# Patient Record
Sex: FEMALE | Race: WHITE | NOT HISPANIC OR LATINO | Employment: FULL TIME | URBAN - METROPOLITAN AREA
[De-identification: names, ages, dates, MRNs, and addresses within clinical notes are randomized per-mention and may not be internally consistent; named-entity substitution may affect disease eponyms.]

---

## 2023-05-10 ENCOUNTER — OFFICE VISIT (OUTPATIENT)
Dept: FAMILY MEDICINE | Facility: CLINIC | Age: 38
End: 2023-05-10
Payer: COMMERCIAL

## 2023-05-10 ENCOUNTER — LAB VISIT (OUTPATIENT)
Dept: LAB | Facility: HOSPITAL | Age: 38
End: 2023-05-10
Payer: COMMERCIAL

## 2023-05-10 VITALS
TEMPERATURE: 98 F | BODY MASS INDEX: 21.94 KG/M2 | WEIGHT: 128.5 LBS | DIASTOLIC BLOOD PRESSURE: 68 MMHG | OXYGEN SATURATION: 98 % | HEIGHT: 64 IN | HEART RATE: 110 BPM | SYSTOLIC BLOOD PRESSURE: 110 MMHG

## 2023-05-10 DIAGNOSIS — Z83.49 FAMILY HISTORY OF ALPHA 1 ANTITRYPSIN DEFICIENCY: ICD-10-CM

## 2023-05-10 DIAGNOSIS — R23.3 EASY BRUISING: ICD-10-CM

## 2023-05-10 DIAGNOSIS — E78.00 ELEVATED LDL CHOLESTEROL LEVEL: ICD-10-CM

## 2023-05-10 DIAGNOSIS — J32.9 SINUSITIS, UNSPECIFIED CHRONICITY, UNSPECIFIED LOCATION: Primary | ICD-10-CM

## 2023-05-10 DIAGNOSIS — Z76.89 ESTABLISHING CARE WITH NEW DOCTOR, ENCOUNTER FOR: ICD-10-CM

## 2023-05-10 DIAGNOSIS — R68.83 CHILLS: ICD-10-CM

## 2023-05-10 DIAGNOSIS — Z86.718 HISTORY OF DEEP VENOUS THROMBOSIS (DVT) OF DISTAL VEIN OF RIGHT LOWER EXTREMITY: ICD-10-CM

## 2023-05-10 DIAGNOSIS — R05.1 ACUTE COUGH: ICD-10-CM

## 2023-05-10 LAB
IRON SERPL-MCNC: 81 UG/DL (ref 30–160)
SATURATED IRON: 32 % (ref 20–50)
TOTAL IRON BINDING CAPACITY: 256 UG/DL (ref 250–450)
TRANSFERRIN SERPL-MCNC: 173 MG/DL (ref 200–375)

## 2023-05-10 PROCEDURE — 36415 COLL VENOUS BLD VENIPUNCTURE: CPT | Performed by: FAMILY MEDICINE

## 2023-05-10 PROCEDURE — 3078F PR MOST RECENT DIASTOLIC BLOOD PRESSURE < 80 MM HG: ICD-10-PCS | Mod: S$GLB,,, | Performed by: FAMILY MEDICINE

## 2023-05-10 PROCEDURE — 82542 COL CHROMOTOGRAPHY QUAL/QUAN: CPT | Performed by: FAMILY MEDICINE

## 2023-05-10 PROCEDURE — 99999 PR PBB SHADOW E&M-EST. PATIENT-LVL IV: ICD-10-PCS | Mod: PBBFAC,,, | Performed by: FAMILY MEDICINE

## 2023-05-10 PROCEDURE — 1159F PR MEDICATION LIST DOCUMENTED IN MEDICAL RECORD: ICD-10-PCS | Mod: S$GLB,,, | Performed by: FAMILY MEDICINE

## 2023-05-10 PROCEDURE — 99204 PR OFFICE/OUTPT VISIT, NEW, LEVL IV, 45-59 MIN: ICD-10-PCS | Mod: S$GLB,,, | Performed by: FAMILY MEDICINE

## 2023-05-10 PROCEDURE — 3074F SYST BP LT 130 MM HG: CPT | Mod: S$GLB,,, | Performed by: FAMILY MEDICINE

## 2023-05-10 PROCEDURE — 3008F PR BODY MASS INDEX (BMI) DOCUMENTED: ICD-10-PCS | Mod: S$GLB,,, | Performed by: FAMILY MEDICINE

## 2023-05-10 PROCEDURE — 99999 PR PBB SHADOW E&M-EST. PATIENT-LVL IV: CPT | Mod: PBBFAC,,, | Performed by: FAMILY MEDICINE

## 2023-05-10 PROCEDURE — 99204 OFFICE O/P NEW MOD 45 MIN: CPT | Mod: S$GLB,,, | Performed by: FAMILY MEDICINE

## 2023-05-10 PROCEDURE — 3078F DIAST BP <80 MM HG: CPT | Mod: S$GLB,,, | Performed by: FAMILY MEDICINE

## 2023-05-10 PROCEDURE — 1160F RVW MEDS BY RX/DR IN RCRD: CPT | Mod: S$GLB,,, | Performed by: FAMILY MEDICINE

## 2023-05-10 PROCEDURE — 3074F PR MOST RECENT SYSTOLIC BLOOD PRESSURE < 130 MM HG: ICD-10-PCS | Mod: S$GLB,,, | Performed by: FAMILY MEDICINE

## 2023-05-10 PROCEDURE — 84466 ASSAY OF TRANSFERRIN: CPT | Performed by: FAMILY MEDICINE

## 2023-05-10 PROCEDURE — 82607 VITAMIN B-12: CPT | Performed by: FAMILY MEDICINE

## 2023-05-10 PROCEDURE — 3008F BODY MASS INDEX DOCD: CPT | Mod: S$GLB,,, | Performed by: FAMILY MEDICINE

## 2023-05-10 PROCEDURE — 1160F PR REVIEW ALL MEDS BY PRESCRIBER/CLIN PHARMACIST DOCUMENTED: ICD-10-PCS | Mod: S$GLB,,, | Performed by: FAMILY MEDICINE

## 2023-05-10 PROCEDURE — 1159F MED LIST DOCD IN RCRD: CPT | Mod: S$GLB,,, | Performed by: FAMILY MEDICINE

## 2023-05-10 RX ORDER — MOMETASONE FUROATE 50 UG/1
2 SPRAY, METERED NASAL DAILY
Qty: 17 G | Refills: 1 | Status: SHIPPED | OUTPATIENT
Start: 2023-05-10

## 2023-05-10 RX ORDER — NEOMYCIN SULFATE, POLYMYXIN B SULFATE AND HYDROCORTISONE 10; 3.5; 1 MG/ML; MG/ML; [USP'U]/ML
3 SUSPENSION/ DROPS AURICULAR (OTIC) 3 TIMES DAILY
Qty: 10 ML | Refills: 0 | Status: SHIPPED | OUTPATIENT
Start: 2023-05-10 | End: 2023-09-05

## 2023-05-10 RX ORDER — ESOMEPRAZOLE MAGNESIUM 40 MG/1
40 CAPSULE, DELAYED RELEASE ORAL
COMMUNITY
End: 2023-09-05

## 2023-05-10 RX ORDER — AZITHROMYCIN 250 MG/1
TABLET, FILM COATED ORAL
Qty: 6 TABLET | Refills: 0 | Status: SHIPPED | OUTPATIENT
Start: 2023-05-10 | End: 2023-05-15

## 2023-05-10 NOTE — PROGRESS NOTES
Subjective:       Patient ID: Diann Buitrago is a 37 y.o. female.    Chief Complaint: Establish Care    Pt here to [facility].    States her mother has been sick, in hospital, and the doctors told pt to get checked for the bronchiectasis and pulmonary fibrosis (genetic type) and also has hereditary immunodeficiency.    Hx of dvt RLE, took Xarelto x 3 months and resolved. Was taking OCP and smoking at the time of the DVT.    Total hysterectomy at age 29yo.    Currently on HRT.    C/o sinus pressure, otalgia, congestion, cough, low grade fever and chills. Cough nonprod. Tried OTC meds without improvement.    Depression Patient Health Questionnaire 5/10/2023   Over the last two weeks how often have you been bothered by little interest or pleasure in doing things Not at all   Over the last two weeks how often have you been bothered by feeling down, depressed or hopeless Not at all   PHQ-2 Total Score 0     No flowsheet data found.    Review of Systems   All other systems reviewed and are negative.      Past Medical History:   Diagnosis Date    Endometriosis, unspecified     GERD (gastroesophageal reflux disease)      Past Surgical History:   Procedure Laterality Date    2 csections      hysterectomy 2014       Family History   Problem Relation Age of Onset    Hypertension Mother     Heart disease Mother        Review of patient's allergies indicates:   Allergen Reactions    Sulfa (sulfonamide antibiotics) Hives       Current Outpatient Medications:     esomeprazole (NEXIUM) 40 MG capsule, Take 40 mg by mouth before breakfast., Disp: , Rfl:     ALPRAZolam (XANAX) 0.5 MG tablet, Take 1 tablet (0.5 mg total) by mouth 3 (three) times daily., Disp: 30 tablet, Rfl: 1    mometasone (NASONEX) 50 mcg/actuation nasal spray, 2 sprays by Nasal route once daily., Disp: 17 g, Rfl: 1    neomycin-polymyxin-hydrocortisone (CORTISPORIN) 3.5-10,000-1 mg/mL-unit/mL-% otic suspension, Place 3 drops into both ears 3 (three) times daily.,  "Disp: 10 mL, Rfl: 0      Objective:      /68 (BP Location: Left arm, Patient Position: Sitting, BP Method: Medium (Manual))   Pulse 110   Temp 98.2 °F (36.8 °C) (Tympanic)   Ht 5' 4" (1.626 m)   Wt 58.3 kg (128 lb 8.5 oz)   SpO2 98%   BMI 22.06 kg/m²   Physical Exam  Vitals and nursing note reviewed.   Constitutional:       General: She is not in acute distress.     Appearance: Normal appearance. She is well-developed and normal weight. She is not ill-appearing, toxic-appearing or diaphoretic.   HENT:      Head: Normocephalic and atraumatic.      Comments: Tender to maxillary and frontal sinuses bilat     Right Ear: Ear canal and external ear normal. There is no impacted cerumen.      Left Ear: Ear canal and external ear normal. There is no impacted cerumen.      Ears:      Comments: Dull TM bilat     Mouth/Throat:      Mouth: Mucous membranes are moist.      Pharynx: No oropharyngeal exudate.      Comments: Cobbling of post OP  Eyes:      General: No scleral icterus.        Right eye: No discharge.         Left eye: No discharge.      Extraocular Movements: Extraocular movements intact.      Conjunctiva/sclera: Conjunctivae normal.      Pupils: Pupils are equal, round, and reactive to light.   Neck:      Thyroid: No thyromegaly.      Vascular: No JVD.      Trachea: No tracheal deviation.   Cardiovascular:      Rate and Rhythm: Normal rate and regular rhythm.      Pulses: Normal pulses.      Heart sounds: Normal heart sounds. No murmur heard.    No friction rub. No gallop.   Pulmonary:      Effort: Pulmonary effort is normal. No respiratory distress.      Breath sounds: Normal breath sounds. No wheezing, rhonchi or rales.   Abdominal:      General: Abdomen is flat. There is no distension.      Palpations: Abdomen is soft.   Musculoskeletal:      Cervical back: Normal range of motion and neck supple. No tenderness.      Right lower leg: No edema.      Left lower leg: No edema.   Lymphadenopathy:      " Cervical: Cervical adenopathy (mild ant/post LAD, nontender) present.   Skin:     General: Skin is warm and dry.      Capillary Refill: Capillary refill takes less than 2 seconds.      Coloration: Skin is not jaundiced or pale.      Findings: No erythema or rash.   Neurological:      General: No focal deficit present.      Mental Status: She is alert and oriented to person, place, and time. Mental status is at baseline.      Motor: No weakness.      Coordination: Coordination normal.      Gait: Gait normal.   Psychiatric:         Mood and Affect: Mood normal.         Behavior: Behavior normal.         Thought Content: Thought content normal.         Judgment: Judgment normal.       Assessment:       1. Sinusitis, unspecified chronicity, unspecified location    2. Family history of alpha 1 antitrypsin deficiency    3. History of deep venous thrombosis (DVT) of distal vein of right lower extremity    4. Easy bruising    5. Elevated LDL cholesterol level    6. Acute cough    7. Chills    8. Establishing care with new doctor, encounter for        Plan:       Sinusitis, unspecified chronicity, unspecified location  -     azithromycin (Z-NICKO) 250 MG tablet; Take 2 tablets by mouth on day 1; Take 1 tablet by mouth on days 2-5  Dispense: 6 tablet; Refill: 0  -     neomycin-polymyxin-hydrocortisone (CORTISPORIN) 3.5-10,000-1 mg/mL-unit/mL-% otic suspension; Place 3 drops into both ears 3 (three) times daily.  Dispense: 10 mL; Refill: 0  -     mometasone (NASONEX) 50 mcg/actuation nasal spray; 2 sprays by Nasal route once daily.  Dispense: 17 g; Refill: 1    Family history of alpha 1 antitrypsin deficiency  -     Alpha 1 Antitrypsin Defiiciency Profile; Future; Expected date: 05/10/2023    History of deep venous thrombosis (DVT) of distal vein of right lower extremity  -     US Lower Extremity Veins Right; Future; Expected date: 05/10/2023    Easy bruising  -     Vitamin B12; Future; Expected date: 05/10/2023  -     Iron and  TIB; Future; Expected date: 05/10/2023    Elevated LDL cholesterol level    Acute cough  -     POCT Influenza A/B    Chills  -     POCT COVID-19 Rapid Screening  -     POCT Influenza A/B    Establishing care with new doctor, encounter for      Screen for alpha-1 antitrypsin def  Flu and COVID testing today  Azithromycin if symptoms persist or worsen  Push fluids  Robitussin DM      Supplements:  Vitamin C 1000mg daily  Zinc 50mg daily  Magnesium 400mg daily  Vitamin B12 500mg daily  Vitamin D3 2,000 IU daily    Take above supplements for 14 days            Previous records in Epic were reviewed, including the last 3 months of encounters, imaging, laboratory, and pathology reports.    Strict return precautions reviewed and patient verbalized understanding. Risks, benefits, and alternatives to the plan were reviewed in detail and all questions answered to the patient's satisfaction. Patient agrees to return to clinic or ER if symptoms worsen. 40 minutes total were spent on today's visit, not limited to but including time based on counseling and coordination of care.    Patient instructed that best way to communicate with my office staff is for patient to get on the [facility] epic patient portal to expedite communication and communication issues that may occur.  Patient was given instructions on how to get on the portal.  I encouraged patient to obtain portal access as well.  Ultimately it is up to the patient to obtain access.  Patient voiced understanding.    This note was created using [employer]ather[employer] voice recognition software that occasionally may misinterpret phrases or words.    Follow up in about 4 weeks (around 6/7/2023) for follow up.

## 2023-05-11 LAB — VIT B12 SERPL-MCNC: 363 PG/ML (ref 210–950)

## 2023-05-14 NOTE — PROGRESS NOTES
Iron level looks okay, but B12 is low normal.  The lung screening test will take a while to result.      I recommend you start a methylated B complex such as Dissolve Basic B Complex (or an equivalent product) which can be found on Amazon or on the Dissolve website.

## 2023-05-17 LAB
A1AT PROTEOTYPE S/Z, LC-MS/MS: NORMAL
A1AT SERPL NEPH-MCNC: 149 MG/DL (ref 100–190)

## 2023-05-18 ENCOUNTER — HOSPITAL ENCOUNTER (EMERGENCY)
Facility: HOSPITAL | Age: 38
Discharge: HOME | End: 2023-05-18
Attending: EMERGENCY MEDICINE
Payer: COMMERCIAL

## 2023-05-18 VITALS
HEIGHT: 64 IN | BODY MASS INDEX: 22.02 KG/M2 | RESPIRATION RATE: 20 BRPM | TEMPERATURE: 98 F | HEART RATE: 152 BPM | SYSTOLIC BLOOD PRESSURE: 140 MMHG | WEIGHT: 129 LBS | OXYGEN SATURATION: 99 % | DIASTOLIC BLOOD PRESSURE: 116 MMHG

## 2023-05-18 DIAGNOSIS — F41.9 ANXIETY: Primary | ICD-10-CM

## 2023-05-18 DIAGNOSIS — S00.33XA CONTUSION OF NOSE, INITIAL ENCOUNTER: ICD-10-CM

## 2023-05-18 DIAGNOSIS — T14.90XA TRAUMA: ICD-10-CM

## 2023-05-18 PROCEDURE — 99283 EMERGENCY DEPT VISIT LOW MDM: CPT

## 2023-05-18 PROCEDURE — 70150 XR FACIAL BONES 3 OR MORE VIEW: ICD-10-PCS | Mod: 26,,, | Performed by: RADIOLOGY

## 2023-05-18 PROCEDURE — 70150 X-RAY EXAM OF FACIAL BONES: CPT | Mod: 26,,, | Performed by: RADIOLOGY

## 2023-05-18 PROCEDURE — 70150 X-RAY EXAM OF FACIAL BONES: CPT | Mod: TC

## 2023-05-18 RX ORDER — ALPRAZOLAM 0.5 MG/1
0.5 TABLET ORAL 3 TIMES DAILY
Qty: 30 TABLET | Refills: 1 | Status: SHIPPED | OUTPATIENT
Start: 2023-05-18 | End: 2023-06-09 | Stop reason: SDUPTHER

## 2023-05-18 NOTE — Clinical Note
"Diann Griffithley" Iftikhar was seen and treated in our emergency department on 5/18/2023.  She may return to work on 05/19/2023.       If you have any questions or concerns, please don't hesitate to call.      Shreya Dozier LPN    "

## 2023-05-18 NOTE — DISCHARGE INSTRUCTIONS
Follow-up with primary care physician as necessary, return emergency with any worsening nausea vomiting diarrhea or otherwise.
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
## 2023-05-21 PROBLEM — Z86.718 HISTORY OF DEEP VENOUS THROMBOSIS (DVT) OF DISTAL VEIN OF RIGHT LOWER EXTREMITY: Status: ACTIVE | Noted: 2023-05-21

## 2023-05-29 ENCOUNTER — TELEPHONE (OUTPATIENT)
Dept: FAMILY MEDICINE | Facility: CLINIC | Age: 38
End: 2023-05-29
Payer: COMMERCIAL

## 2023-05-29 NOTE — TELEPHONE ENCOUNTER
----- Message from Devin Yancey sent at 5/29/2023  3:10 PM CDT -----  Type:  Patient Returning Call    Who Called:  Pt  Who Left Message for Patient:  Geoff  Does the patient know what this is regarding?:  Yes labs  Best Call Back Number:  [phone]  Additional Information:  Pl call bk to advise thanks

## 2023-05-29 NOTE — TELEPHONE ENCOUNTER
----- Message from Gi Saxena MD sent at 5/14/2023  9:59 AM CDT -----  Iron level looks okay, but B12 is low normal.  The lung screening test will take a while to result.      I recommend you start a methylated B complex such as Casper Basic B Complex (or an equivalent product) which can be found on Amazon or on the [employer] website.

## 2023-05-29 NOTE — TELEPHONE ENCOUNTER
----- Message from Gi Saxena MD sent at 5/14/2023  9:59 AM CDT -----  Iron level looks okay, but B12 is low normal.  The lung screening test will take a while to result.      I recommend you start a methylated B complex such as Casper Basic B Complex (or an equivalent product) which can be found on Amazon or on the [employer] website.

## 2023-06-09 ENCOUNTER — OFFICE VISIT (OUTPATIENT)
Dept: FAMILY MEDICINE | Facility: CLINIC | Age: 38
End: 2023-06-09
Payer: COMMERCIAL

## 2023-06-09 ENCOUNTER — TELEPHONE (OUTPATIENT)
Dept: FAMILY MEDICINE | Facility: CLINIC | Age: 38
End: 2023-06-09

## 2023-06-09 DIAGNOSIS — F42.9 OBSESSIVE-COMPULSIVE DISORDER, UNSPECIFIED TYPE: ICD-10-CM

## 2023-06-09 DIAGNOSIS — F41.0 GENERALIZED ANXIETY DISORDER WITH PANIC ATTACKS: Primary | ICD-10-CM

## 2023-06-09 DIAGNOSIS — F41.1 GENERALIZED ANXIETY DISORDER WITH PANIC ATTACKS: Primary | ICD-10-CM

## 2023-06-09 DIAGNOSIS — R11.0 NAUSEA: ICD-10-CM

## 2023-06-09 DIAGNOSIS — F41.0 PANIC ATTACK: ICD-10-CM

## 2023-06-09 PROCEDURE — 99214 OFFICE O/P EST MOD 30 MIN: CPT | Mod: 95,,, | Performed by: FAMILY MEDICINE

## 2023-06-09 PROCEDURE — 1159F MED LIST DOCD IN RCRD: CPT | Mod: 95,,, | Performed by: FAMILY MEDICINE

## 2023-06-09 PROCEDURE — 1159F PR MEDICATION LIST DOCUMENTED IN MEDICAL RECORD: ICD-10-PCS | Mod: 95,,, | Performed by: FAMILY MEDICINE

## 2023-06-09 PROCEDURE — 1160F RVW MEDS BY RX/DR IN RCRD: CPT | Mod: 95,,, | Performed by: FAMILY MEDICINE

## 2023-06-09 PROCEDURE — 1160F PR REVIEW ALL MEDS BY PRESCRIBER/CLIN PHARMACIST DOCUMENTED: ICD-10-PCS | Mod: 95,,, | Performed by: FAMILY MEDICINE

## 2023-06-09 PROCEDURE — 99214 PR OFFICE/OUTPT VISIT, EST, LEVL IV, 30-39 MIN: ICD-10-PCS | Mod: 95,,, | Performed by: FAMILY MEDICINE

## 2023-06-09 RX ORDER — ALPRAZOLAM 0.5 MG/1
0.5 TABLET ORAL 3 TIMES DAILY PRN
Qty: 75 TABLET | Refills: 0 | Status: SHIPPED | OUTPATIENT
Start: 2023-06-09 | End: 2023-07-07 | Stop reason: SDUPTHER

## 2023-06-09 RX ORDER — VILAZODONE HYDROCHLORIDE 10 MG/1
10 TABLET ORAL NIGHTLY
Qty: 30 TABLET | Refills: 1 | Status: SHIPPED | OUTPATIENT
Start: 2023-06-09 | End: 2023-09-05

## 2023-06-09 RX ORDER — ONDANSETRON 4 MG/1
4 TABLET, ORALLY DISINTEGRATING ORAL EVERY 6 HOURS PRN
Qty: 30 TABLET | Refills: 0 | Status: SHIPPED | OUTPATIENT
Start: 2023-06-09 | End: 2023-11-06 | Stop reason: SDUPTHER
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
## 2023-06-09 NOTE — Clinical Note
Need virtual visit or in-person visit around 3 and half to 4 weeks (before I am out of the office in July. )  New medications started today and need just a quick visit to touch base and discussed dose titration etc..

## 2023-06-09 NOTE — TELEPHONE ENCOUNTER
----- Message from Gi Saxena MD sent at 6/9/2023  8:38 AM CDT -----  Need virtual visit or in-person visit around 3 and half to 4 weeks (before I am out of the office in July. )  New medications started today and need just a quick visit to touch base and discussed dose titration etc..

## 2023-07-07 ENCOUNTER — OFFICE VISIT (OUTPATIENT)
Dept: FAMILY MEDICINE | Facility: CLINIC | Age: 38
End: 2023-07-07
Payer: COMMERCIAL

## 2023-07-07 ENCOUNTER — TELEPHONE (OUTPATIENT)
Dept: FAMILY MEDICINE | Facility: CLINIC | Age: 38
End: 2023-07-07

## 2023-07-07 DIAGNOSIS — F41.0 PANIC ATTACK: ICD-10-CM

## 2023-07-07 DIAGNOSIS — F41.1 GENERALIZED ANXIETY DISORDER WITH PANIC ATTACKS: ICD-10-CM

## 2023-07-07 DIAGNOSIS — F41.0 GENERALIZED ANXIETY DISORDER WITH PANIC ATTACKS: ICD-10-CM

## 2023-07-07 DIAGNOSIS — K57.92 DIVERTICULITIS: Primary | ICD-10-CM

## 2023-07-07 DIAGNOSIS — B37.31 VAGINAL YEAST INFECTION: ICD-10-CM

## 2023-07-07 PROCEDURE — 1159F PR MEDICATION LIST DOCUMENTED IN MEDICAL RECORD: ICD-10-PCS | Mod: 95,,, | Performed by: FAMILY MEDICINE

## 2023-07-07 PROCEDURE — 1160F RVW MEDS BY RX/DR IN RCRD: CPT | Mod: 95,,, | Performed by: FAMILY MEDICINE

## 2023-07-07 PROCEDURE — 99214 PR OFFICE/OUTPT VISIT, EST, LEVL IV, 30-39 MIN: ICD-10-PCS | Mod: 95,,, | Performed by: FAMILY MEDICINE

## 2023-07-07 PROCEDURE — 1160F PR REVIEW ALL MEDS BY PRESCRIBER/CLIN PHARMACIST DOCUMENTED: ICD-10-PCS | Mod: 95,,, | Performed by: FAMILY MEDICINE

## 2023-07-07 PROCEDURE — 99214 OFFICE O/P EST MOD 30 MIN: CPT | Mod: 95,,, | Performed by: FAMILY MEDICINE

## 2023-07-07 PROCEDURE — 1159F MED LIST DOCD IN RCRD: CPT | Mod: 95,,, | Performed by: FAMILY MEDICINE

## 2023-07-07 RX ORDER — ALPRAZOLAM 0.5 MG/1
0.5 TABLET ORAL 3 TIMES DAILY PRN
Qty: 75 TABLET | Refills: 1 | Status: SHIPPED | OUTPATIENT
Start: 2023-07-07 | End: 2023-09-05 | Stop reason: SDUPTHER

## 2023-07-07 RX ORDER — TERCONAZOLE 8 MG/G
1 CREAM VAGINAL NIGHTLY
Qty: 20 G | Refills: 0 | Status: SHIPPED | OUTPATIENT
Start: 2023-07-07 | End: 2023-07-10

## 2023-07-07 NOTE — TELEPHONE ENCOUNTER
----- Message from Gi Saxena MD sent at 7/7/2023  2:27 PM CDT -----  Schedule 8 khadijah f/fransisco german

## 2023-07-07 NOTE — PROGRESS NOTES
Established Patient - Audio Only Telehealth Visit     The patient location is: [place]  The chief complaint leading to consultation is: follow up anxiety, med check  Visit type: Virtual visit with audio only (telephone)  Total time spent with patient: 22       The reason for the audio only service rather than synchronous audio and video virtual visit was related to technical difficulties or patient preference/necessity.     Each patient to whom I provide medical services by telemedicine is:  (1) informed of the relationship between the physician and patient and the respective role of any other health care provider with respect to management of the patient; and (2) notified that they may decline to receive medical services by telemedicine and may withdraw from such care at any time. Patient verbally consented to receive this service via voice-only telephone call.       HPI: Had ER visit at [facility] on 6/20, was dx with diverticulitis. States she stopped taking the Viibryd due to side effects such as gas/abdominal bloat, nausea, hot flashes, joint pain, muscle twitching, and tingling. Had panic attack in the ER. Took Augmentin x 10 days now with vaginal yeast infection. Monistat did not help. Taking probiotic.     Assessment and plan:     Recent diverticulitis--she has appt with GI on Monday of next week; bland and soft diet; continue Nexium   2.    Anxiety--refill Xanax; hesitant to add any new medications given GI issues, will continue Xanax prn, have her find therapist accepting new patients, and follow up around 6-8 weeks  3.    [place] 3 for vaginal yeast infection as OTC Monistat did not help          Strict return precautions reviewed and patient verbalized understanding. Risks, benefits, and alternatives to the plan were reviewed in detail and all questions answered to the patient's satisfaction. Patient agrees to return to clinic or ER if symptoms worsen. 22 minutes total were spent on today's visit, not  limited to but including time based on counseling and coordination of care.    Patient instructed that best way to communicate with my office staff is for patient to get on the [facility] epic patient portal to expedite communication and communication issues that may occur.  Patient was given instructions on how to get on the portal.  I encouraged patient to obtain portal access as well.  Ultimately it is up to the patient to obtain access.  Patient voiced understanding.    This note was created using [employer] voice recognition software that occasionally may misinterpret phrases or words.                     This service was not originating from a related E/M service provided within the previous 7 days nor will  to an E/M service or procedure within the next 24 hours or my soonest available appointment.  Prevailing standard of care was able to be met in this audio-only visit.

## 2023-09-05 ENCOUNTER — OFFICE VISIT (OUTPATIENT)
Dept: FAMILY MEDICINE | Facility: CLINIC | Age: 38
End: 2023-09-05
Payer: COMMERCIAL

## 2023-09-05 DIAGNOSIS — F41.0 GENERALIZED ANXIETY DISORDER WITH PANIC ATTACKS: Primary | ICD-10-CM

## 2023-09-05 DIAGNOSIS — F42.9 OBSESSIVE-COMPULSIVE DISORDER, UNSPECIFIED TYPE: ICD-10-CM

## 2023-09-05 DIAGNOSIS — F41.1 GENERALIZED ANXIETY DISORDER WITH PANIC ATTACKS: Primary | ICD-10-CM

## 2023-09-05 DIAGNOSIS — Z79.899 MEDICATION THERAPY CHANGED: ICD-10-CM

## 2023-09-05 DIAGNOSIS — F41.0 PANIC ATTACK: ICD-10-CM

## 2023-09-05 PROBLEM — Z86.59 HISTORY OF PANIC DISORDER: Status: ACTIVE | Noted: 2023-09-05

## 2023-09-05 PROBLEM — Z87.11 HISTORY OF STOMACH ULCERS: Status: ACTIVE | Noted: 2023-09-05

## 2023-09-05 PROBLEM — K44.9 HIATAL HERNIA WITH GERD: Status: ACTIVE | Noted: 2023-09-05

## 2023-09-05 PROBLEM — K21.9 HIATAL HERNIA WITH GERD: Status: ACTIVE | Noted: 2023-09-05

## 2023-09-05 PROBLEM — F41.9 ANXIETY: Status: ACTIVE | Noted: 2023-09-05

## 2023-09-05 PROCEDURE — 99214 PR OFFICE/OUTPT VISIT, EST, LEVL IV, 30-39 MIN: ICD-10-PCS | Mod: 95,,, | Performed by: FAMILY MEDICINE

## 2023-09-05 PROCEDURE — 1159F PR MEDICATION LIST DOCUMENTED IN MEDICAL RECORD: ICD-10-PCS | Mod: 95,,, | Performed by: FAMILY MEDICINE

## 2023-09-05 PROCEDURE — 1160F RVW MEDS BY RX/DR IN RCRD: CPT | Mod: 95,,, | Performed by: FAMILY MEDICINE

## 2023-09-05 PROCEDURE — 99214 OFFICE O/P EST MOD 30 MIN: CPT | Mod: 95,,, | Performed by: FAMILY MEDICINE

## 2023-09-05 PROCEDURE — 1159F MED LIST DOCD IN RCRD: CPT | Mod: 95,,, | Performed by: FAMILY MEDICINE

## 2023-09-05 PROCEDURE — 1160F PR REVIEW ALL MEDS BY PRESCRIBER/CLIN PHARMACIST DOCUMENTED: ICD-10-PCS | Mod: 95,,, | Performed by: FAMILY MEDICINE

## 2023-09-05 RX ORDER — HYDROGEN PEROXIDE 3 %
1 SOLUTION, NON-ORAL MISCELLANEOUS 2 TIMES DAILY
COMMUNITY
Start: 2023-06-20

## 2023-09-05 RX ORDER — ALPRAZOLAM 0.5 MG/1
0.5 TABLET ORAL 3 TIMES DAILY PRN
Qty: 75 TABLET | Refills: 1 | Status: SHIPPED | OUTPATIENT
Start: 2023-09-05 | End: 2023-11-06 | Stop reason: SDUPTHER

## 2023-09-05 NOTE — PROGRESS NOTES
Established Patient - Audio Only Telehealth Visit     The patient location is: [place]  The chief complaint leading to consultation is: follow up anxiety, med check  Visit type: Virtual visit with audio only (telephone)  Total time spent with patient: 30 minutes       The reason for the audio only service rather than synchronous audio and video virtual visit was related to technical difficulties or patient preference/necessity.     Each patient to whom I provide medical services by telemedicine is:  (1) informed of the relationship between the physician and patient and the respective role of any other health care provider with respect to management of the patient; and (2) notified that they may decline to receive medical services by telemedicine and may withdraw from such care at any time. Patient verbally consented to receive this service via voice-only telephone call.       HPI: 36yo WF telephone visit for follow up anxiety. She is taking Viibryd. Mother passed away July 10 from heart and lung disease.    She had cscope showed diverticulosis and internal hemorrhoids. EGD showed polyps and she had esophageal dilitation.     Assessment and plan:  BALA with panic attacks and OCD, multiple med failures. I recommend pharmacogenomics panel with MTHFR evaluation as well to help with medication options in the future. Will refill Xanax for help with symptoms while awaiting lab results.                        This service was not originating from a related E/M service provided within the previous 7 days nor will  to an E/M service or procedure within the next 24 hours or my soonest available appointment.  Prevailing standard of care was able to be met in this audio-only visit.

## 2023-11-24 ENCOUNTER — HOSPITAL ENCOUNTER (EMERGENCY)
Facility: HOSPITAL | Age: 38
Discharge: HOME | End: 2023-11-24
Attending: EMERGENCY MEDICINE
Payer: COMMERCIAL

## 2023-11-24 VITALS
HEART RATE: 109 BPM | OXYGEN SATURATION: 100 % | HEIGHT: 64 IN | WEIGHT: 125 LBS | SYSTOLIC BLOOD PRESSURE: 109 MMHG | RESPIRATION RATE: 14 BRPM | TEMPERATURE: 98 F | BODY MASS INDEX: 21.34 KG/M2 | DIASTOLIC BLOOD PRESSURE: 62 MMHG

## 2023-11-24 DIAGNOSIS — F41.9 ANXIETY: ICD-10-CM

## 2023-11-24 DIAGNOSIS — K57.92 DIVERTICULITIS: Primary | ICD-10-CM

## 2023-11-24 DIAGNOSIS — R00.0 TACHYCARDIA: ICD-10-CM

## 2023-11-24 LAB
ALBUMIN SERPL BCP-MCNC: 3.8 G/DL (ref 3.5–5.2)
ALP SERPL-CCNC: 90 U/L (ref 55–135)
ALT SERPL W/O P-5'-P-CCNC: 10 U/L (ref 10–44)
ANION GAP SERPL CALC-SCNC: 11 MMOL/L (ref 8–16)
AST SERPL-CCNC: 12 U/L (ref 10–40)
BACTERIA #/AREA URNS HPF: ABNORMAL /HPF
BASOPHILS # BLD AUTO: 0.05 K/UL (ref 0–0.2)
BASOPHILS NFR BLD: 0.5 % (ref 0–1.9)
BILIRUB SERPL-MCNC: 0.5 MG/DL (ref 0.1–1)
BILIRUB UR QL STRIP: NEGATIVE
BUN SERPL-MCNC: 8 MG/DL (ref 6–20)
CALCIUM SERPL-MCNC: 9.5 MG/DL (ref 8.7–10.5)
CHLORIDE SERPL-SCNC: 103 MMOL/L (ref 95–110)
CLARITY UR: ABNORMAL
CO2 SERPL-SCNC: 26 MMOL/L (ref 23–29)
COLOR UR: YELLOW
CREAT SERPL-MCNC: 0.7 MG/DL (ref 0.5–1.4)
DIFFERENTIAL METHOD: ABNORMAL
EOSINOPHIL # BLD AUTO: 0 K/UL (ref 0–0.5)
EOSINOPHIL NFR BLD: 0 % (ref 0–8)
ERYTHROCYTE [DISTWIDTH] IN BLOOD BY AUTOMATED COUNT: 11.7 % (ref 11.5–14.5)
EST. GFR  (NO RACE VARIABLE): >60 ML/MIN/1.73 M^2
GLUCOSE SERPL-MCNC: 110 MG/DL (ref 70–110)
GLUCOSE UR QL STRIP: NEGATIVE
HCT VFR BLD AUTO: 39.3 % (ref 37–48.5)
HGB BLD-MCNC: 13.7 G/DL (ref 12–16)
HGB UR QL STRIP: ABNORMAL
HYALINE CASTS #/AREA URNS LPF: 1 /LPF
IMM GRANULOCYTES # BLD AUTO: 0.04 K/UL (ref 0–0.04)
IMM GRANULOCYTES NFR BLD AUTO: 0.4 % (ref 0–0.5)
KETONES UR QL STRIP: ABNORMAL
LEUKOCYTE ESTERASE UR QL STRIP: NEGATIVE
LIPASE SERPL-CCNC: 12 U/L (ref 4–60)
LYMPHOCYTES # BLD AUTO: 1.3 K/UL (ref 1–4.8)
LYMPHOCYTES NFR BLD: 12.1 % (ref 18–48)
MCH RBC QN AUTO: 32.2 PG (ref 27–31)
MCHC RBC AUTO-ENTMCNC: 34.9 G/DL (ref 32–36)
MCV RBC AUTO: 92 FL (ref 82–98)
MICROSCOPIC COMMENT: ABNORMAL
MONOCYTES # BLD AUTO: 0.5 K/UL (ref 0.3–1)
MONOCYTES NFR BLD: 4.5 % (ref 4–15)
NEUTROPHILS # BLD AUTO: 8.8 K/UL (ref 1.8–7.7)
NEUTROPHILS NFR BLD: 82.5 % (ref 38–73)
NITRITE UR QL STRIP: NEGATIVE
NRBC BLD-RTO: 0 /100 WBC
PH UR STRIP: 7 [PH] (ref 5–8)
PLATELET # BLD AUTO: 253 K/UL (ref 150–450)
PMV BLD AUTO: 9.4 FL (ref 9.2–12.9)
POTASSIUM SERPL-SCNC: 3.9 MMOL/L (ref 3.5–5.1)
PROT SERPL-MCNC: 7.5 G/DL (ref 6–8.4)
PROT UR QL STRIP: NEGATIVE
RBC # BLD AUTO: 4.26 M/UL (ref 4–5.4)
RBC #/AREA URNS HPF: 2 /HPF (ref 0–4)
SODIUM SERPL-SCNC: 140 MMOL/L (ref 136–145)
SP GR UR STRIP: 1 (ref 1–1.03)
SQUAMOUS #/AREA URNS HPF: 19 /HPF
URN SPEC COLLECT METH UR: ABNORMAL
UROBILINOGEN UR STRIP-ACNC: NEGATIVE EU/DL
WBC # BLD AUTO: 10.7 K/UL (ref 3.9–12.7)
WBC #/AREA URNS HPF: 7 /HPF (ref 0–5)

## 2023-11-24 PROCEDURE — 93010 ELECTROCARDIOGRAM REPORT: CPT | Mod: ,,, | Performed by: GENERAL PRACTICE

## 2023-11-24 PROCEDURE — 81000 URINALYSIS NONAUTO W/SCOPE: CPT | Performed by: EMERGENCY MEDICINE

## 2023-11-24 PROCEDURE — 96361 HYDRATE IV INFUSION ADD-ON: CPT

## 2023-11-24 PROCEDURE — 80053 COMPREHEN METABOLIC PANEL: CPT | Performed by: NURSE PRACTITIONER

## 2023-11-24 PROCEDURE — 93010 EKG 12-LEAD: ICD-10-PCS | Mod: ,,, | Performed by: GENERAL PRACTICE

## 2023-11-24 PROCEDURE — 83690 ASSAY OF LIPASE: CPT | Performed by: NURSE PRACTITIONER

## 2023-11-24 PROCEDURE — 63600175 PHARM REV CODE 636 W HCPCS: Performed by: NURSE PRACTITIONER

## 2023-11-24 PROCEDURE — 85025 COMPLETE CBC W/AUTO DIFF WBC: CPT | Performed by: NURSE PRACTITIONER

## 2023-11-24 PROCEDURE — 25000003 PHARM REV CODE 250: Performed by: NURSE PRACTITIONER

## 2023-11-24 PROCEDURE — 96375 TX/PRO/DX INJ NEW DRUG ADDON: CPT

## 2023-11-24 PROCEDURE — 99285 EMERGENCY DEPT VISIT HI MDM: CPT | Mod: 25

## 2023-11-24 PROCEDURE — 36415 COLL VENOUS BLD VENIPUNCTURE: CPT | Performed by: NURSE PRACTITIONER

## 2023-11-24 PROCEDURE — 93005 ELECTROCARDIOGRAM TRACING: CPT

## 2023-11-24 PROCEDURE — 25500020 PHARM REV CODE 255

## 2023-11-24 PROCEDURE — 96374 THER/PROPH/DIAG INJ IV PUSH: CPT

## 2023-11-24 RX ORDER — AMOXICILLIN AND CLAVULANATE POTASSIUM 875; 125 MG/1; MG/1
1 TABLET, FILM COATED ORAL 2 TIMES DAILY
Qty: 20 TABLET | Refills: 0 | Status: SHIPPED | OUTPATIENT
Start: 2023-11-24 | End: 2023-11-24 | Stop reason: SDUPTHER

## 2023-11-24 RX ORDER — AMOXICILLIN AND CLAVULANATE POTASSIUM 875; 125 MG/1; MG/1
1 TABLET, FILM COATED ORAL 2 TIMES DAILY
Qty: 20 TABLET | Refills: 0 | Status: SHIPPED | OUTPATIENT
Start: 2023-11-24 | End: 2023-12-04

## 2023-11-24 RX ORDER — ONDANSETRON 2 MG/ML
4 INJECTION INTRAMUSCULAR; INTRAVENOUS
Status: COMPLETED | OUTPATIENT
Start: 2023-11-24 | End: 2023-11-24

## 2023-11-24 RX ORDER — MORPHINE SULFATE 2 MG/ML
2 INJECTION, SOLUTION INTRAMUSCULAR; INTRAVENOUS
Status: COMPLETED | OUTPATIENT
Start: 2023-11-24 | End: 2023-11-24

## 2023-11-24 RX ADMIN — IOHEXOL 75 ML: 350 INJECTION, SOLUTION INTRAVENOUS at 12:11

## 2023-11-24 RX ADMIN — ONDANSETRON 4 MG: 2 INJECTION INTRAMUSCULAR; INTRAVENOUS at 11:11

## 2023-11-24 RX ADMIN — SODIUM CHLORIDE 1000 ML: 9 INJECTION, SOLUTION INTRAVENOUS at 11:11

## 2023-11-24 RX ADMIN — MORPHINE SULFATE 2 MG: 2 INJECTION, SOLUTION INTRAMUSCULAR; INTRAVENOUS at 11:11

## 2023-11-24 NOTE — Clinical Note
"Diann Buitrago (Ashley) was seen and treated in our emergency department on 11/24/2023.  She may return to work on 11/26/2023.       If you have any questions or concerns, please don't hesitate to call.      Nuria COREA    "

## 2023-11-24 NOTE — ED PROVIDER NOTES
Encounter Date: 11/24/2023       History     Chief Complaint   Patient presents with    Abdominal Pain     LLQ started Wednesday     Patient is a 37 y.o. female who presents to the ED 11/24/2023 with a chief complaint of Abdominal pain that started Wednesday afternoon of this week.  She is not had any fever although she has had some chills and nausea.  She is not had any vomiting.  She has not had any diarrhea.  She is had similar pain in the past related to her diverticulitis.  She states she is very anxious and took a Xanax prior to arrival.  She is a history of hysterectomy.  She denies any other abdominal surgeries in the past.  She also has a history of reflux she states and diverticulitis.  She is allergic to sulfa denies any other allergies.             Review of patient's allergies indicates:   Allergen Reactions    Sulfa (sulfonamide antibiotics) Hives     Past Medical History:   Diagnosis Date    Endometriosis, unspecified     GERD (gastroesophageal reflux disease)      Past Surgical History:   Procedure Laterality Date    2 csections      hysterectomy 2014       Family History   Problem Relation Age of Onset    Hypertension Mother     Heart disease Mother      Social History     Tobacco Use    Smoking status: Never    Smokeless tobacco: Never   Substance Use Topics    Alcohol use: Not Currently    Drug use: Never     Review of Systems   Constitutional:  Positive for chills. Negative for fever.   HENT:  Negative for sore throat.    Respiratory:  Negative for chest tightness and shortness of breath.    Cardiovascular:  Negative for chest pain.   Gastrointestinal:  Positive for abdominal pain and nausea. Negative for constipation, diarrhea and vomiting.   Genitourinary:  Negative for dysuria.   Musculoskeletal:  Negative for arthralgias and myalgias.   Skin:  Negative for rash and wound.   Neurological:  Negative for syncope.   Hematological:  Does not bruise/bleed easily.       Physical Exam     Initial  Vitals [11/24/23 1048]   BP Pulse Resp Temp SpO2   119/76 100 20 98.2 °F (36.8 °C) 100 %      MAP       --         Physical Exam    Nursing note and vitals reviewed.  Constitutional: Vital signs are normal. She appears well-developed and well-nourished.   HENT:   Head: Normocephalic and atraumatic.   Eyes: Pupils are equal, round, and reactive to light.   Neck: Neck supple.   Cardiovascular:  Regular rhythm, normal heart sounds and intact distal pulses.   Tachycardia present.   Exam reveals no gallop and no friction rub.       No murmur heard.  Pulmonary/Chest: Breath sounds normal. She has no wheezes. She has no rhonchi. She has no rales.   Abdominal: Abdomen is soft. Bowel sounds are normal. There is abdominal tenderness in the left lower quadrant. Hernia confirmed negative in the right inguinal area and confirmed negative in the left inguinal area.   No right CVA tenderness.  No left CVA tenderness.   Genitourinary:    Vagina normal.      Pelvic exam was performed with patient supine.   There is no rash, tenderness, lesion or injury on the right labia. There is no rash, tenderness, lesion or injury on the left labia. Right adnexum displays no mass, no tenderness and no fullness. Left adnexum displays no mass, no tenderness and no fullness.   Musculoskeletal:      Cervical back: Neck supple.     Lymphadenopathy: No inguinal adenopathy noted on the right or left side.   Neurological: She is alert and oriented to person, place, and time. She has normal strength.   Skin: Skin is warm, dry and intact.   Psychiatric: Her speech is normal and behavior is normal. Her mood appears anxious.         ED Course   Procedures  Labs Reviewed   URINALYSIS, REFLEX TO URINE CULTURE - Abnormal; Notable for the following components:       Result Value    Appearance, UA Hazy (*)     Ketones, UA 1+ (*)     Occult Blood UA 1+ (*)     All other components within normal limits    Narrative:     Specimen Source->Urine   URINALYSIS  MICROSCOPIC - Abnormal; Notable for the following components:    WBC, UA 7 (*)     Bacteria Few (*)     All other components within normal limits    Narrative:     Specimen Source->Urine   CBC W/ AUTO DIFFERENTIAL - Abnormal; Notable for the following components:    MCH 32.2 (*)     Gran # (ANC) 8.8 (*)     Gran % 82.5 (*)     Lymph % 12.1 (*)     All other components within normal limits   COMPREHENSIVE METABOLIC PANEL   LIPASE     EKG Readings: (Independently Interpreted)   Initial Reading: No STEMI. Rhythm: Sinus Tachycardia. Ectopy: No Ectopy. Conduction: Normal. ST Segments: Normal ST Segments. T Waves: Normal. Clinical Impression: Normal Sinus Rhythm       Imaging Results              CT Abdomen Pelvis With IV Contrast NO Oral Contrast (Final result)  Result time 11/24/23 13:18:40      Final result by Hansa Benavides MD (11/24/23 13:18:40)                   Impression:      Abnormal appearance within the pelvis with free fluid, fat stranding, irregular ill-defined fluid and fat densities.  Findings thought most likely acute diverticulitis.  Fat stranding around the bladder and wall thickening of the bladder also questioning cystitis which could be primary or secondary.  Differential could include also epiploic appendagitis but not the typical appearance of such.    14 mm fluid density at the perineum on the right suggesting Bartholin's cyst.      Electronically signed by: Hansa Benavides MD  Date:    11/24/2023  Time:    13:18               Narrative:    EXAMINATION:  CT ABDOMEN PELVIS WITH IV CONTRAST    CLINICAL HISTORY:  LLQ abdominal pain;    TECHNIQUE:  Low dose axial images, sagittal and coronal reformations were obtained from the lung bases to the pubic symphysis following the IV administration of 75 mL of Omnipaque 350 and no p.o. contrast    COMPARISON:  None    FINDINGS:  Liver, pancreas, adrenal glands, kidneys, and spleen unremarkable appearance.  Symmetrical renal enhancement.  Spleen not  enlarged.  No calcified stones the gallbladder or CT findings of acute cholecystitis.  No biliary duct dilatation.  Abdominal aorta tapers without aneurysmal dilatation.    Stomach, bowel, mesentery; prominent amount of stool within the colon.  Normal appearance of the appendix.  Transverse colon extends down low in the pelvis.  There is fat stranding in the pelvis including around the segment of transverse colon, and adjacent urinary bladder, and adjacent sigmoid colon.  There is diverticulosis and the segment of the sigmoid colon wall is indistinct and heterogeneous with small irregular ill-defined fluid densities with irregular adjacent collections of fat and fat stranding for example axial series 2 images 142-135, coronal series 601 images 47-56.  Findings most likely represent acute diverticulitis.  The stranding around the urinary bladder could be reactionary or there could also be cystitis.  Prior hysterectomy.  Ovaries are not identified.    No free intraperitoneal air.  No drainable abscess is seen.    1.5 cm fluid density at the perineum the right suggesting Bartholin's cyst.                                       Medications   sodium chloride 0.9% bolus 1,000 mL 1,000 mL (0 mLs Intravenous Stopped 11/24/23 1248)   ondansetron injection 4 mg (4 mg Intravenous Given 11/24/23 1148)   morphine injection 2 mg (2 mg Intravenous Given 11/24/23 1148)   iohexoL (OMNIPAQUE 350) 350 mg iodine/mL injection (75 mLs Intravenous Given 11/24/23 1211)     Medical Decision Making  Amount and/or Complexity of Data Reviewed  Labs: ordered.  Radiology: ordered.    Risk  Prescription drug management.         APC / Resident Notes:   Patient is a 37 y.o. female who presents to the ED 11/24/2023 who underwent emergent evaluation for abdominal pain consistent with previous episodes of diverticulitis. CT of abdomen and pelvis consistent with diverticulitis. No evidence of abscess or perforation or other acute findings. Labs  unremarkable.  No leukocytosis.  No severe dehydration or electrolyte abnormalities.  Nausea resolved with antiemetics in the emergency department.  She is given IV fluids.  Her tachycardia improved in the emergency department the patient remains anxious while in the emergency department and states her HR is chronically elevated due to this. EKG without evidence of arrhythmia or ischemia.  I do believe her tachycardia is likely due to her anxiety.  CT with incidental findings of Bartholin cyst.   exam without evidence of abscess or drainable fluid collection. She is made aware of this findings. She is discharged home on antibiotics. Based on my clinical evaluation, I do not appreciate any immediate, emergent, or life threatening condition or etiology that warrants additional workup today and feel that the patient can be discharged with close follow up care. Case discussed with Dr. Lozano who is agreeable to plan of care. Follow up and return precautions discussed; patient verbalized understanding and is agreeable to plan of care. Patient discharged home in stable condition.               Attending Attestation:     Physician Attestation Statement for NP/PA:   I have directed and reviewed the workup performed by the PA/NP.  I performed the substantive portion of the medical decision making.     Other NP/PA Attestation Additions:    History of Present Illness: 37-year-old female presented with abdominal pain.    Medical Decision Making: Initial differential diagnosis included but not limited to diverticulitis, colitis, and enteritis.  I am in agreement with the nurse practitioner's assessment, treatment, and plan of care.                        Medical Decision Making:   Differential Diagnosis:   Diverticulitis  Colitis  abscess          Clinical Impression:  Final diagnoses:  [R00.0] Tachycardia  [K57.92] Diverticulitis (Primary)  [F41.9] Anxiety          ED Disposition Condition    Discharge Stable          ED  Prescriptions       Medication Sig Dispense Start Date End Date Auth. Provider    amoxicillin-clavulanate 875-125mg (AUGMENTIN) 875-125 mg per tablet  (Status: Discontinued) Take 1 tablet by mouth 2 (two) times daily. for 10 days 20 tablet 11/24/2023 11/24/2023 Juliann Carias NP    amoxicillin-clavulanate 875-125mg (AUGMENTIN) 875-125 mg per tablet Take 1 tablet by mouth 2 (two) times daily. for 10 days 20 tablet 11/24/2023 12/4/2023 Juliann Carias NP          Follow-up Information       Follow up With Specialties Details Why Contact Info Additional Information    Gi Saxena MD Family Medicine In 2 days  [address] [facility] [place] [address] [phone]       Madina Mcneal MD Gastroenterology, Internal Medicine In 2 days  [address]  [address]  [facility] [address]  [phone]       [facility] -  Emergency Medicine  As needed, If symptoms worsen [address] [place] [address] [address] [place] [address] 1st floor             Juliann Carias NP  11/24/23 6001       Nick Lozano MD  11/24/23 5494

## 2023-12-13 ENCOUNTER — TELEPHONE (OUTPATIENT)
Dept: FAMILY MEDICINE | Facility: CLINIC | Age: 38
End: 2023-12-13
Payer: COMMERCIAL

## 2023-12-13 NOTE — TELEPHONE ENCOUNTER
----- Message from Grisel Bates sent at 12/13/2023  2:50 PM CST -----  Contact: PT  Type:  Needs Medical Advice    Who Called: PT   Symptoms (please be specific): PT ASK THAT YOU PLEASE LOOK AT THE LAB RESULT AND OTHER TEST RESULTS FROM HER 11/24/23 ER VISIT TO DISCUSS ON HER 12/18/23 VIRTUAL VISIT   PT PREFERS A CALL OVER A VIDEO IF POSSIBLE. PLEASE CALL CONFIRM  Would the patient rather a call back or a response via [facility]? CALL   Best Call Back Number: [phone] (home) [phone] (work)  Additional Information: THANK YOU

## 2023-12-18 ENCOUNTER — OFFICE VISIT (OUTPATIENT)
Dept: FAMILY MEDICINE | Facility: CLINIC | Age: 38
End: 2023-12-18
Payer: COMMERCIAL

## 2023-12-18 DIAGNOSIS — B37.31 VAGINAL CANDIDIASIS: ICD-10-CM

## 2023-12-18 DIAGNOSIS — Z79.899 LONG-TERM CURRENT USE OF BENZODIAZEPINE: ICD-10-CM

## 2023-12-18 DIAGNOSIS — N30.00 ACUTE CYSTITIS WITHOUT HEMATURIA: ICD-10-CM

## 2023-12-18 DIAGNOSIS — J01.10 ACUTE FRONTAL SINUSITIS, RECURRENCE NOT SPECIFIED: ICD-10-CM

## 2023-12-18 DIAGNOSIS — Z13.220 SCREENING, LIPID: ICD-10-CM

## 2023-12-18 DIAGNOSIS — F41.0 GENERALIZED ANXIETY DISORDER WITH PANIC ATTACKS: Primary | ICD-10-CM

## 2023-12-18 DIAGNOSIS — J30.9 ALLERGIC RHINITIS, UNSPECIFIED SEASONALITY, UNSPECIFIED TRIGGER: ICD-10-CM

## 2023-12-18 DIAGNOSIS — F41.0 PANIC ATTACK: ICD-10-CM

## 2023-12-18 DIAGNOSIS — F41.1 GENERALIZED ANXIETY DISORDER WITH PANIC ATTACKS: Primary | ICD-10-CM

## 2023-12-18 PROCEDURE — 1159F MED LIST DOCD IN RCRD: CPT | Mod: 95,,, | Performed by: FAMILY MEDICINE

## 2023-12-18 PROCEDURE — 1159F PR MEDICATION LIST DOCUMENTED IN MEDICAL RECORD: ICD-10-PCS | Mod: 95,,, | Performed by: FAMILY MEDICINE

## 2023-12-18 PROCEDURE — 1160F RVW MEDS BY RX/DR IN RCRD: CPT | Mod: 95,,, | Performed by: FAMILY MEDICINE

## 2023-12-18 PROCEDURE — 99214 OFFICE O/P EST MOD 30 MIN: CPT | Mod: 95,,, | Performed by: FAMILY MEDICINE

## 2023-12-18 PROCEDURE — 1160F PR REVIEW ALL MEDS BY PRESCRIBER/CLIN PHARMACIST DOCUMENTED: ICD-10-PCS | Mod: 95,,, | Performed by: FAMILY MEDICINE

## 2023-12-18 PROCEDURE — 99214 PR OFFICE/OUTPT VISIT, EST, LEVL IV, 30-39 MIN: ICD-10-PCS | Mod: 95,,, | Performed by: FAMILY MEDICINE

## 2023-12-18 RX ORDER — FLUCONAZOLE 150 MG/1
150 TABLET ORAL
Qty: 5 TABLET | Refills: 0 | Status: SHIPPED | OUTPATIENT
Start: 2023-12-18

## 2023-12-18 RX ORDER — DOXYCYCLINE 100 MG/1
100 CAPSULE ORAL 2 TIMES DAILY
Qty: 20 CAPSULE | Refills: 0 | Status: SHIPPED | OUTPATIENT
Start: 2023-12-18 | End: 2023-12-28

## 2023-12-18 RX ORDER — ALPRAZOLAM 0.5 MG/1
0.5 TABLET ORAL 3 TIMES DAILY PRN
Qty: 75 TABLET | Refills: 2 | Status: SHIPPED | OUTPATIENT
Start: 2023-12-18

## 2023-12-18 RX ORDER — LORATADINE 10 MG/1
10 TABLET ORAL 2 TIMES DAILY
Qty: 30 TABLET | Refills: 0 | Status: SHIPPED | OUTPATIENT
Start: 2023-12-18 | End: 2024-12-17

## 2023-12-18 NOTE — PROGRESS NOTES
The patient location is: [place]  The chief complaint leading to consultation is: follow up ER, diverticulitis    Visit type: audiovisual    Face to Face time with patient: 20 minutes  30 minutes of total time spent on the encounter, which includes face to face time and non-face to face time preparing to see the patient (eg, review of tests), Obtaining and/or reviewing separately obtained history, Documenting clinical information in the electronic or other health record, Independently interpreting results (not separately reported) and communicating results to the patient/family/caregiver, or Care coordination (not separately reported).         Each patient to whom he or she provides medical services by telemedicine is:  (1) informed of the relationship between the physician and patient and the respective role of any other health care provider with respect to management of the patient; and (2) notified that he or she may decline to receive medical services by telemedicine and may withdraw from such care at any time.    Notes:       Labs/Tests:  CBC:  Lab Results   Component Value Date    WBC 10.70 11/24/2023    RBC 4.26 11/24/2023    HGB 13.7 11/24/2023    HCT 39.3 11/24/2023    MCV 92 11/24/2023    MCH 32.2 (H) 11/24/2023    MCHC 34.9 11/24/2023    RDW 11.7 11/24/2023     11/24/2023    MPV 9.4 11/24/2023    GRAN 8.8 (H) 11/24/2023    GRAN 82.5 (H) 11/24/2023    LYMPH 1.3 11/24/2023    LYMPH 12.1 (L) 11/24/2023    MONO 0.5 11/24/2023    MONO 4.5 11/24/2023    EOS 0.0 11/24/2023    BASO 0.05 11/24/2023    EOSINOPHIL 0.0 11/24/2023    BASOPHIL 0.5 11/24/2023    DIFFMETHOD Automated 11/24/2023     CMP:  Lab Results   Component Value Date     11/24/2023    K 3.9 11/24/2023     11/24/2023    CO2 26 11/24/2023    BUN 8 11/24/2023    CREATININE 0.7 11/24/2023     11/24/2023    CALCIUM 9.5 11/24/2023    ALKPHOS 90 11/24/2023    PROT 7.5 11/24/2023    ALBUMIN 3.8 11/24/2023    BILITOT 0.5 11/24/2023     "AST 12 11/24/2023    ALT 10 11/24/2023    ANIONGAP 11 11/24/2023    EGFRNORACEVR >60 11/24/2023     HA1C:  No results found for: "HGBA1C", "ESTIMATEDAVG"  LIPIDS:  Lab Results   Component Value Date    CHOL 198 12/27/2023    TRIG 117 12/27/2023    HDL 44 12/27/2023    LDLCALC 130.6 12/27/2023    CHOLHDL 22.2 12/27/2023    TOTALCHOLEST 4.5 12/27/2023    NONHDLCHOL 154 12/27/2023     Iron / TIBC:  Lab Results   Component Value Date    IRON 81 05/10/2023    TRANSFERRIN 173 (L) 05/10/2023    TIBC 256 05/10/2023    FESATURATED 32 05/10/2023     Vit B / Vit D:  Lab Results   Component Value Date    NAFYOBZJ40 363 05/10/2023     UA:  Lab Results   Component Value Date    COLORU Yellow 12/27/2023    APPEARANCEUA Clear 12/27/2023    PHUR 6.0 12/27/2023    SPECGRAV 1.020 12/27/2023    PROTEINUA Negative 12/27/2023    GLUCUA Negative 12/27/2023    KETONESU Negative 12/27/2023    BILIRUBINUA Negative 12/27/2023    OCCULTUA Negative 12/27/2023    NITRITE Negative 12/27/2023    UROBILINOGEN Negative 11/24/2023    LEUKOCYTESUR Negative 12/27/2023     UA Reflex w/ Culture:  Lab Results   Component Value Date    LEUKOCYTESUR Negative 12/27/2023    UROBILINOGEN Negative 11/24/2023    BACTERIA Few (A) 11/24/2023    RBCUA 2 11/24/2023    WBCUA 7 (H) 11/24/2023       Subjective:       Patient ID: Diann Buitrago is a 38 y.o. female.    Chief Complaint: No chief complaint on file.    VV for follow up ER visit on 11-24-23 for acute diverticulitis and acute cystitis. Was treated with Augmentin and patient reports resolution of symptoms. She has had some intermittent stomach cramping before or after stooling. No fever, chills, shakes etc. No dysuria, no hematuria.    Reports 2 day hx of sinus congestion and drainage, no cough. +HA, +sore throat (mild.)     Requests refill on Xanax. Reports anxiety has increased through the holidays but she has not felt the need to increase her dose.          5/10/2023     8:51 AM   Depression Patient Health " Questionnaire   Over the last two weeks how often have you been bothered by little interest or pleasure in doing things Not at all   Over the last two weeks how often have you been bothered by feeling down, depressed or hopeless Not at all   PHQ-2 Total Score 0          No data to display                Review of Systems   Constitutional:  Negative for activity change and unexpected weight change.   HENT:  Positive for rhinorrhea. Negative for hearing loss and trouble swallowing.    Eyes:  Negative for discharge and visual disturbance.   Respiratory:  Negative for chest tightness and wheezing.    Cardiovascular:  Negative for chest pain and palpitations.   Gastrointestinal:  Negative for blood in stool, constipation, diarrhea and vomiting.   Endocrine: Negative for polydipsia and polyuria.   Genitourinary:  Negative for difficulty urinating, dysuria, hematuria and menstrual problem.   Musculoskeletal:  Negative for arthralgias, joint swelling and neck pain.   Neurological:  Positive for headaches. Negative for weakness.   Psychiatric/Behavioral:  Negative for confusion and dysphoric mood.    All other systems reviewed and are negative.        Past Medical History:   Diagnosis Date    Endometriosis, unspecified     GERD (gastroesophageal reflux disease)      Past Surgical History:   Procedure Laterality Date    2 csections      hysterectomy 2014       Family History   Problem Relation Age of Onset    Hypertension Mother     Heart disease Mother        Review of patient's allergies indicates:   Allergen Reactions    Sulfa (sulfonamide antibiotics) Hives       Current Outpatient Medications:     ALPRAZolam (XANAX) 0.5 MG tablet, Take 1 tablet (0.5 mg total) by mouth 3 (three) times daily as needed for Anxiety (or panic attack)., Disp: 75 tablet, Rfl: 2    esomeprazole (NEXIUM) 20 MG capsule, Take 1 capsule by mouth 2 (two) times a day., Disp: , Rfl:     fluconazole (DIFLUCAN) 150 MG Tab, Take 1 tablet (150 mg total) by  mouth every 72 hours as needed (yeast)., Disp: 5 tablet, Rfl: 0    loratadine (CLARITIN) 10 mg tablet, Take 1 tablet (10 mg total) by mouth 2 (two) times a day. For allergy symptoms, Disp: 30 tablet, Rfl: 0    mometasone (NASONEX) 50 mcg/actuation nasal spray, 2 sprays by Nasal route once daily., Disp: 17 g, Rfl: 1    ondansetron (ZOFRAN-ODT) 4 MG TbDL, Take 1 tablet (4 mg total) by mouth every 6 (six) hours as needed (nausea)., Disp: 30 tablet, Rfl: 0      Objective:      There were no vitals taken for this visit.  Physical Exam  Constitutional:       General: She is not in acute distress.  Pulmonary:      Effort: Pulmonary effort is normal. No respiratory distress.   Neurological:      Mental Status: She is alert and oriented to person, place, and time.   Psychiatric:         Mood and Affect: Mood normal.         Behavior: Behavior normal.         Thought Content: Thought content normal.         Judgment: Judgment normal.         Assessment:       1. Generalized anxiety disorder with panic attacks    2. Allergic rhinitis, unspecified seasonality, unspecified trigger    3. Vaginal candidiasis    4. Acute frontal sinusitis, recurrence not specified    5. Panic attack    6. Screening, lipid    7. Acute cystitis without hematuria    8. Long-term current use of benzodiazepine        Plan:       Generalized anxiety disorder with panic attacks  -     ALPRAZolam (XANAX) 0.5 MG tablet; Take 1 tablet (0.5 mg total) by mouth 3 (three) times daily as needed for Anxiety (or panic attack).  Dispense: 75 tablet; Refill: 2  -     Pain Clinic Drug Screen; Future; Expected date: 01/01/2024    Allergic rhinitis, unspecified seasonality, unspecified trigger  -     loratadine (CLARITIN) 10 mg tablet; Take 1 tablet (10 mg total) by mouth 2 (two) times a day. For allergy symptoms  Dispense: 30 tablet; Refill: 0    Vaginal candidiasis  -     fluconazole (DIFLUCAN) 150 MG Tab; Take 1 tablet (150 mg total) by mouth every 72 hours as needed  (yeast).  Dispense: 5 tablet; Refill: 0    Acute frontal sinusitis, recurrence not specified  -     doxycycline (VIBRAMYCIN) 100 MG Cap; Take 1 capsule (100 mg total) by mouth 2 (two) times daily. for 10 days  Dispense: 20 capsule; Refill: 0    Panic attack  -     ALPRAZolam (XANAX) 0.5 MG tablet; Take 1 tablet (0.5 mg total) by mouth 3 (three) times daily as needed for Anxiety (or panic attack).  Dispense: 75 tablet; Refill: 2  -     Pain Clinic Drug Screen; Future; Expected date: 01/01/2024    Screening, lipid  -     Lipid Panel; Future; Expected date: 01/01/2024    Acute cystitis without hematuria  -     Urinalysis, Reflex to Urine Culture Urine, Clean Catch; Future; Expected date: 01/01/2024    Long-term current use of benzodiazepine  -     Pain Clinic Drug Screen; Future; Expected date: 01/01/2024    Probiotic daily, for at least 30 day but optimally 90 days  Diflucan prn  Doxycyline ONLY if fever, dental pain, or purulent sinus discharge  Clairitn for allergy symptoms, clear drainage etc  Resume Nasonex nasal spray          Previous records in Epic were reviewed, including the last 3 months of encounters, imaging, laboratory, and pathology reports.    Strict return precautions reviewed and patient verbalized understanding. Risks, benefits, and alternatives to the plan were reviewed in detail and all questions answered to the patient's satisfaction. Patient agrees to return to clinic or ER if symptoms worsen. 30 minutes total were spent on today's visit, not limited to but including time based on counseling and coordination of care.    Patient instructed that best way to communicate with my office staff is for patient to get on the [facility] epic patient portal to expedite communication and communication issues that may occur.  Patient was given instructions on how to get on the portal.  I encouraged patient to obtain portal access as well.  Ultimately it is up to the patient to obtain access.  Patient voiced  understanding.    This note was created using [employer] voice recognition software that occasionally may misinterpret phrases or words.    Follow up in about 3 months (around 3/18/2024) for follow up anxiety, lab results.

## 2023-12-27 ENCOUNTER — LAB VISIT (OUTPATIENT)
Dept: LAB | Facility: HOSPITAL | Age: 38
End: 2023-12-27
Attending: FAMILY MEDICINE
Payer: COMMERCIAL

## 2023-12-27 DIAGNOSIS — N30.00 ACUTE CYSTITIS WITHOUT HEMATURIA: ICD-10-CM

## 2023-12-27 LAB
BILIRUB UR QL STRIP: NEGATIVE
CLARITY UR REFRACT.AUTO: CLEAR
COLOR UR AUTO: YELLOW
GLUCOSE UR QL STRIP: NEGATIVE
HGB UR QL STRIP: NEGATIVE
KETONES UR QL STRIP: NEGATIVE
LEUKOCYTE ESTERASE UR QL STRIP: NEGATIVE
NITRITE UR QL STRIP: NEGATIVE
PH UR STRIP: 6 [PH] (ref 5–8)
PROT UR QL STRIP: NEGATIVE
SP GR UR STRIP: 1.02 (ref 1–1.03)
URN SPEC COLLECT METH UR: NORMAL

## 2023-12-27 PROCEDURE — 81003 URINALYSIS AUTO W/O SCOPE: CPT | Performed by: FAMILY MEDICINE

## 2024-03-13 PROBLEM — E78.2 MIXED HYPERLIPIDEMIA: Status: ACTIVE | Noted: 2023-05-10

## 2024-04-05 ENCOUNTER — OFFICE VISIT (OUTPATIENT)
Dept: FAMILY MEDICINE | Facility: CLINIC | Age: 39
End: 2024-04-05
Payer: COMMERCIAL

## 2024-04-05 DIAGNOSIS — F41.1 GENERALIZED ANXIETY DISORDER WITH PANIC ATTACKS: Primary | ICD-10-CM

## 2024-04-05 DIAGNOSIS — F42.9 OBSESSIVE-COMPULSIVE DISORDER, UNSPECIFIED TYPE: ICD-10-CM

## 2024-04-05 DIAGNOSIS — Z76.0 MEDICATION REFILL: ICD-10-CM

## 2024-04-05 DIAGNOSIS — Z86.16 HISTORY OF COVID-19: ICD-10-CM

## 2024-04-05 DIAGNOSIS — F41.0 GENERALIZED ANXIETY DISORDER WITH PANIC ATTACKS: Primary | ICD-10-CM

## 2024-04-05 PROCEDURE — G2211 COMPLEX E/M VISIT ADD ON: HCPCS | Mod: NDTC,ICN,, | Performed by: FAMILY MEDICINE

## 2024-04-05 PROCEDURE — 1159F MED LIST DOCD IN RCRD: CPT | Mod: 95,,, | Performed by: FAMILY MEDICINE

## 2024-04-05 PROCEDURE — 1160F RVW MEDS BY RX/DR IN RCRD: CPT | Mod: 95,,, | Performed by: FAMILY MEDICINE

## 2024-04-05 PROCEDURE — 99215 OFFICE O/P EST HI 40 MIN: CPT | Mod: 95,ICN,, | Performed by: FAMILY MEDICINE

## 2024-04-05 RX ORDER — GUAIFENESIN 100 MG/5ML
200 SOLUTION ORAL 3 TIMES DAILY PRN
Qty: 118 ML | Refills: 0 | Status: SHIPPED | OUTPATIENT
Start: 2024-04-05 | End: 2024-04-15

## 2024-04-05 RX ORDER — ALPRAZOLAM 1 MG/1
1 TABLET ORAL 2 TIMES DAILY PRN
Qty: 60 TABLET | Refills: 2 | Status: SHIPPED | OUTPATIENT
Start: 2024-04-05 | End: 2024-07-04

## 2024-04-05 NOTE — PROGRESS NOTES
The patient location is: [place]  The chief complaint leading to consultation is: follow up anxiety, recent COVID    Visit type: audiovisual    Face to Face time with patient: 30  40 minutes of total time spent on the encounter, which includes face to face time and non-face to face time preparing to see the patient (eg, review of tests), Obtaining and/or reviewing separately obtained history, Documenting clinical information in the electronic or other health record, Independently interpreting results (not separately reported) and communicating results to the patient/family/caregiver, or Care coordination (not separately reported).         Each patient to whom he or she provides medical services by telemedicine is:  (1) informed of the relationship between the physician and patient and the respective role of any other health care provider with respect to management of the patient; and (2) notified that he or she may decline to receive medical services by telemedicine and may withdraw from such care at any time.    Notes:       Subjective:       Patient ID: Diann Buitrago is a 38 y.o. female.    Chief Complaint: Anxiety    VV for follow up anxiety and medication refills.    Had Covid last month, states her test was negative even though she had the same symptoms were same as her daughters.  Still having congestion and mucus draining down the back of her throat. Not really coughing. Voice still sounds scratchy. She cannot tolerate Sudafed or DM products.    Has anxiety d/o with panic attack, also hx of OCD. Failed Viibryd due to   Mother had bipolar d/o, sister also dx with bipolar d/o. Patient reports both of her daughters have been diagnosed with anxiety.    Anxiety  Presents for follow-up visit. Patient reports no chest pain, confusion or palpitations.             5/10/2023     8:51 AM   Depression Patient Health Questionnaire   Over the last two weeks how often have you been bothered by little interest or pleasure in doing  things Not at all   Over the last two weeks how often have you been bothered by feeling down, depressed or hopeless Not at all   PHQ-2 Total Score 0          No data to display                Review of Systems   Constitutional:  Negative for activity change and unexpected weight change.   HENT:  Positive for rhinorrhea. Negative for hearing loss and trouble swallowing.    Eyes:  Negative for discharge and visual disturbance.   Respiratory:  Negative for chest tightness and wheezing.    Cardiovascular:  Negative for chest pain and palpitations.   Gastrointestinal:  Positive for vomiting. Negative for blood in stool, constipation and diarrhea.   Endocrine: Negative for polydipsia and polyuria.   Genitourinary:  Negative for difficulty urinating, dysuria, hematuria and menstrual problem.   Musculoskeletal:  Negative for arthralgias, joint swelling and neck pain.   Neurological:  Positive for headaches. Negative for weakness.   Psychiatric/Behavioral:  Negative for confusion and dysphoric mood.    All other systems reviewed and are negative.        Past Medical History:   Diagnosis Date    Endometriosis, unspecified     GERD (gastroesophageal reflux disease)      Past Surgical History:   Procedure Laterality Date    2 csections      hysterectomy 2014       Family History   Problem Relation Name Age of Onset    Hypertension Mother      Heart disease Mother         Review of patient's allergies indicates:   Allergen Reactions    Sulfa (sulfonamide antibiotics) Hives       Current Outpatient Medications:     ALPRAZolam (XANAX) 1 MG tablet, Take 1 tablet (1 mg total) by mouth 2 (two) times daily as needed for Anxiety., Disp: 60 tablet, Rfl: 2    esomeprazole (NEXIUM) 20 MG capsule, Take 1 capsule by mouth 2 (two) times a day., Disp: , Rfl:     guaiFENesin 100 mg/5 ml (ROBITUSSIN) 100 mg/5 mL syrup, Take 10 mLs (200 mg total) by mouth 3 (three) times daily as needed for Congestion., Disp: 118 mL, Rfl: 0    loratadine  (CLARITIN) 10 mg tablet, Take 1 tablet (10 mg total) by mouth 2 (two) times a day. For allergy symptoms, Disp: 30 tablet, Rfl: 0    mometasone (NASONEX) 50 mcg/actuation nasal spray, 2 sprays by Nasal route once daily., Disp: 17 g, Rfl: 1    ondansetron (ZOFRAN-ODT) 4 MG TbDL, Take 1 tablet (4 mg total) by mouth every 6 (six) hours as needed (nausea)., Disp: 30 tablet, Rfl: 0      Objective:      There were no vitals taken for this visit.  Physical Exam  Constitutional:       General: She is not in acute distress.  Pulmonary:      Effort: Pulmonary effort is normal. No respiratory distress.   Neurological:      Mental Status: She is alert and oriented to person, place, and time.   Psychiatric:         Mood and Affect: Mood normal.         Behavior: Behavior normal.         Thought Content: Thought content normal.         Judgment: Judgment normal.         Assessment:       1. Generalized anxiety disorder with panic attacks    2. Obsessive-compulsive disorder, unspecified type    3. Medication refill    4. History of COVID-19        Plan:       Generalized anxiety disorder with panic attacks  -     ALPRAZolam (XANAX) 1 MG tablet; Take 1 tablet (1 mg total) by mouth 2 (two) times daily as needed for Anxiety.  Dispense: 60 tablet; Refill: 2  -     Drug screen panel, in-house; Standing    Obsessive-compulsive disorder, unspecified type  -     Drug screen panel, in-house; Standing    Medication refill  -     ALPRAZolam (XANAX) 1 MG tablet; Take 1 tablet (1 mg total) by mouth 2 (two) times daily as needed for Anxiety.  Dispense: 60 tablet; Refill: 2    History of COVID-19  -     guaiFENesin 100 mg/5 ml (ROBITUSSIN) 100 mg/5 mL syrup; Take 10 mLs (200 mg total) by mouth 3 (three) times daily as needed for Congestion.  Dispense: 118 mL; Refill: 0      Drug         Previous records in Epic were reviewed, including the last 3 months of encounters, imaging, laboratory, and pathology reports.    Strict return precautions  reviewed and patient verbalized understanding. Risks, benefits, and alternatives to the plan were reviewed in detail and all questions answered to the patient's satisfaction. Patient agrees to return to clinic or ER if symptoms worsen. 40 minutes total were spent on today's visit, not limited to but including time based on counseling and coordination of care.    Patient instructed that best way to communicate with my office staff is for patient to get on the [facility] epic patient portal to expedite communication and communication issues that may occur.  Patient was given instructions on how to get on the portal.  I encouraged patient to obtain portal access as well.  Ultimately it is up to the patient to obtain access.  Patient voiced understanding.    This note was created using [employer] voice recognition software that occasionally may misinterpret phrases or words.    Follow up in about 3 months (around 7/5/2024) for follow up tests and med refills, anxiety, ocd.

## 2024-04-15 PROBLEM — Z86.16 HISTORY OF COVID-19: Status: ACTIVE | Noted: 2024-04-15

## 2024-04-15 NOTE — PATIENT INSTRUCTIONS
Kirby Tidwell,     If you are due for any health screening(s) below please notify me so we can arrange them to be ordered and scheduled. Most healthy patients at your age complete them, but you are free to accept or refuse.     If you can't do it, I'll definitely understand. If you can, I'd certainly appreciate it!    All of your core healthy metrics are met.

## 2024-05-01 ENCOUNTER — PATIENT MESSAGE (OUTPATIENT)
Dept: FAMILY MEDICINE | Facility: CLINIC | Age: 39
End: 2024-05-01
Payer: COMMERCIAL

## 2024-05-07 ENCOUNTER — HOSPITAL ENCOUNTER (EMERGENCY)
Facility: HOSPITAL | Age: 39
Discharge: HOME | End: 2024-05-07
Attending: EMERGENCY MEDICINE
Payer: COMMERCIAL

## 2024-05-07 VITALS
HEIGHT: 64 IN | OXYGEN SATURATION: 100 % | RESPIRATION RATE: 20 BRPM | DIASTOLIC BLOOD PRESSURE: 62 MMHG | HEART RATE: 94 BPM | TEMPERATURE: 98 F | WEIGHT: 124 LBS | SYSTOLIC BLOOD PRESSURE: 109 MMHG | BODY MASS INDEX: 21.17 KG/M2

## 2024-05-07 DIAGNOSIS — R00.0 TACHYCARDIA: ICD-10-CM

## 2024-05-07 DIAGNOSIS — K57.92 DIVERTICULITIS: ICD-10-CM

## 2024-05-07 DIAGNOSIS — R10.32 LEFT LOWER QUADRANT ABDOMINAL PAIN: Primary | ICD-10-CM

## 2024-05-07 LAB
ALBUMIN SERPL BCP-MCNC: 3.8 G/DL (ref 3.5–5.2)
ALP SERPL-CCNC: 84 U/L (ref 55–135)
ALT SERPL W/O P-5'-P-CCNC: 11 U/L (ref 10–44)
ANION GAP SERPL CALC-SCNC: 8 MMOL/L (ref 8–16)
AST SERPL-CCNC: 12 U/L (ref 10–40)
BASOPHILS # BLD AUTO: 0.06 K/UL (ref 0–0.2)
BASOPHILS NFR BLD: 0.7 % (ref 0–1.9)
BILIRUB SERPL-MCNC: 0.5 MG/DL (ref 0.1–1)
BILIRUB UR QL STRIP: NEGATIVE
BUN SERPL-MCNC: 8 MG/DL (ref 6–20)
CALCIUM SERPL-MCNC: 9.2 MG/DL (ref 8.7–10.5)
CHLORIDE SERPL-SCNC: 104 MMOL/L (ref 95–110)
CLARITY UR: CLEAR
CO2 SERPL-SCNC: 27 MMOL/L (ref 23–29)
COLOR UR: COLORLESS
CREAT SERPL-MCNC: 0.8 MG/DL (ref 0.5–1.4)
DIFFERENTIAL METHOD BLD: ABNORMAL
EOSINOPHIL # BLD AUTO: 0 K/UL (ref 0–0.5)
EOSINOPHIL NFR BLD: 0.1 % (ref 0–8)
ERYTHROCYTE [DISTWIDTH] IN BLOOD BY AUTOMATED COUNT: 11.9 % (ref 11.5–14.5)
EST. GFR  (NO RACE VARIABLE): >60 ML/MIN/1.73 M^2
GLUCOSE SERPL-MCNC: 105 MG/DL (ref 70–110)
GLUCOSE UR QL STRIP: NEGATIVE
HCT VFR BLD AUTO: 39.1 % (ref 37–48.5)
HGB BLD-MCNC: 13.7 G/DL (ref 12–16)
HGB UR QL STRIP: NEGATIVE
IMM GRANULOCYTES # BLD AUTO: 0.02 K/UL (ref 0–0.04)
IMM GRANULOCYTES NFR BLD AUTO: 0.2 % (ref 0–0.5)
KETONES UR QL STRIP: NEGATIVE
LEUKOCYTE ESTERASE UR QL STRIP: NEGATIVE
LIPASE SERPL-CCNC: 15 U/L (ref 4–60)
LYMPHOCYTES # BLD AUTO: 1.1 K/UL (ref 1–4.8)
LYMPHOCYTES NFR BLD: 13.3 % (ref 18–48)
MCH RBC QN AUTO: 32.9 PG (ref 27–31)
MCHC RBC AUTO-ENTMCNC: 35 G/DL (ref 32–36)
MCV RBC AUTO: 94 FL (ref 82–98)
MONOCYTES # BLD AUTO: 0.3 K/UL (ref 0.3–1)
MONOCYTES NFR BLD: 3.5 % (ref 4–15)
NEUTROPHILS # BLD AUTO: 7 K/UL (ref 1.8–7.7)
NEUTROPHILS NFR BLD: 82.2 % (ref 38–73)
NITRITE UR QL STRIP: NEGATIVE
NRBC BLD-RTO: 0 /100 WBC
PH UR STRIP: 7 [PH] (ref 5–8)
PLATELET # BLD AUTO: 230 K/UL (ref 150–450)
PMV BLD AUTO: 9.6 FL (ref 9.2–12.9)
POTASSIUM SERPL-SCNC: 3.9 MMOL/L (ref 3.5–5.1)
PROT SERPL-MCNC: 7.1 G/DL (ref 6–8.4)
PROT UR QL STRIP: NEGATIVE
RBC # BLD AUTO: 4.16 M/UL (ref 4–5.4)
SODIUM SERPL-SCNC: 139 MMOL/L (ref 136–145)
SP GR UR STRIP: 1 (ref 1–1.03)
URN SPEC COLLECT METH UR: ABNORMAL
UROBILINOGEN UR STRIP-ACNC: NEGATIVE EU/DL
WBC # BLD AUTO: 8.5 K/UL (ref 3.9–12.7)

## 2024-05-07 PROCEDURE — 96361 HYDRATE IV INFUSION ADD-ON: CPT

## 2024-05-07 PROCEDURE — 93005 ELECTROCARDIOGRAM TRACING: CPT

## 2024-05-07 PROCEDURE — 96375 TX/PRO/DX INJ NEW DRUG ADDON: CPT

## 2024-05-07 PROCEDURE — 99285 EMERGENCY DEPT VISIT HI MDM: CPT | Mod: 25

## 2024-05-07 PROCEDURE — 25000003 PHARM REV CODE 250: Performed by: EMERGENCY MEDICINE

## 2024-05-07 PROCEDURE — 25500020 PHARM REV CODE 255

## 2024-05-07 PROCEDURE — 36415 COLL VENOUS BLD VENIPUNCTURE: CPT | Performed by: EMERGENCY MEDICINE

## 2024-05-07 PROCEDURE — 85025 COMPLETE CBC W/AUTO DIFF WBC: CPT | Performed by: EMERGENCY MEDICINE

## 2024-05-07 PROCEDURE — 96374 THER/PROPH/DIAG INJ IV PUSH: CPT | Mod: 59

## 2024-05-07 PROCEDURE — 80053 COMPREHEN METABOLIC PANEL: CPT | Performed by: EMERGENCY MEDICINE

## 2024-05-07 PROCEDURE — 83690 ASSAY OF LIPASE: CPT | Performed by: EMERGENCY MEDICINE

## 2024-05-07 PROCEDURE — 81003 URINALYSIS AUTO W/O SCOPE: CPT | Performed by: EMERGENCY MEDICINE

## 2024-05-07 PROCEDURE — 63600175 PHARM REV CODE 636 W HCPCS: Performed by: EMERGENCY MEDICINE

## 2024-05-07 PROCEDURE — 93010 ELECTROCARDIOGRAM REPORT: CPT | Mod: ,,, | Performed by: GENERAL PRACTICE

## 2024-05-07 RX ORDER — ONDANSETRON HYDROCHLORIDE 2 MG/ML
4 INJECTION, SOLUTION INTRAVENOUS
Status: COMPLETED | OUTPATIENT
Start: 2024-05-07 | End: 2024-05-07

## 2024-05-07 RX ORDER — AMOXICILLIN AND CLAVULANATE POTASSIUM 875; 125 MG/1; MG/1
1 TABLET, FILM COATED ORAL 2 TIMES DAILY
Qty: 20 TABLET | Refills: 0 | Status: SHIPPED | OUTPATIENT
Start: 2024-05-07 | End: 2024-05-17

## 2024-05-07 RX ORDER — ONDANSETRON 4 MG/1
4 TABLET, ORALLY DISINTEGRATING ORAL EVERY 6 HOURS PRN
Qty: 12 TABLET | Refills: 0 | Status: SHIPPED | OUTPATIENT
Start: 2024-05-07

## 2024-05-07 RX ORDER — KETOROLAC TROMETHAMINE 30 MG/ML
15 INJECTION, SOLUTION INTRAMUSCULAR; INTRAVENOUS
Status: COMPLETED | OUTPATIENT
Start: 2024-05-07 | End: 2024-05-07

## 2024-05-07 RX ADMIN — ONDANSETRON 4 MG: 2 INJECTION INTRAMUSCULAR; INTRAVENOUS at 11:05

## 2024-05-07 RX ADMIN — KETOROLAC TROMETHAMINE 15 MG: 30 INJECTION, SOLUTION INTRAMUSCULAR at 11:05

## 2024-05-07 RX ADMIN — SODIUM CHLORIDE 1000 ML: 0.9 INJECTION, SOLUTION INTRAVENOUS at 11:05

## 2024-05-07 RX ADMIN — SODIUM CHLORIDE 1000 ML: 9 INJECTION, SOLUTION INTRAVENOUS at 01:05

## 2024-05-07 RX ADMIN — IOHEXOL 75 ML: 350 INJECTION, SOLUTION INTRAVENOUS at 10:05

## 2024-05-07 NOTE — Clinical Note
"Diann Reyes" Iftikhar was seen and treated in our emergency department on 5/7/2024.  She may return to work on 05/09/2024.       If you have any questions or concerns, please don't hesitate to call.      Axel Marrero RN    "

## 2024-05-07 NOTE — ED PROVIDER NOTES
Encounter Date: 5/7/2024       History     Chief Complaint   Patient presents with    Abdominal Pain     LLQ and LUQ started yesterday    Nausea    Chills     38-year-old female past medical history of diverticulitis anxiety presents today with left lower quadrant abdominal pain that began yesterday.  She describes it as crampy and intermittently sharp left lower quadrant abdominal pain.  Patient reports associated nausea and chills.  Denies any vomiting.  Denies any dysuria hematuria.  Denies any diarrhea constipation.  Denies any vaginal bleeding or vaginal discharge.  Denies any chest pain or shortness of breath.  Denies any cough fevers or any other symptoms.    The history is provided by the patient. No  was used.     Review of patient's allergies indicates:   Allergen Reactions    Sulfa (sulfonamide antibiotics) Hives     Past Medical History:   Diagnosis Date    Endometriosis, unspecified     GERD (gastroesophageal reflux disease)      Past Surgical History:   Procedure Laterality Date    2 csections      hysterectomy 2014       Family History   Problem Relation Name Age of Onset    Hypertension Mother      Heart disease Mother       Social History     Tobacco Use    Smoking status: Never    Smokeless tobacco: Never   Substance Use Topics    Alcohol use: Not Currently    Drug use: Never     Review of Systems    Physical Exam     Initial Vitals [05/07/24 0858]   BP Pulse Resp Temp SpO2   128/62 (!) 140 20 98.1 °F (36.7 °C) 100 %      MAP       --         Physical Exam    Nursing note and vitals reviewed.  Constitutional: She appears well-developed. She is not diaphoretic. No distress.   HENT:   Head: Normocephalic and atraumatic.   Nose: Nose normal.   Eyes: EOM are normal. Pupils are equal, round, and reactive to light. No scleral icterus.   Neck: Neck supple.   Normal range of motion.  Cardiovascular:  Normal rate, regular rhythm, normal heart sounds and intact distal pulses.     Exam  reveals no gallop and no friction rub.       No murmur heard.  Pulmonary/Chest: Breath sounds normal. No stridor. No respiratory distress. She has no wheezes. She has no rhonchi. She has no rales.   Abdominal: Abdomen is soft. Bowel sounds are normal. She exhibits no distension. There is abdominal tenderness.   Left lower quadrant tenderness.  No rebound guarding or rigidity. There is no rebound and no guarding.   Musculoskeletal:         General: Normal range of motion.      Cervical back: Normal range of motion and neck supple.     Neurological: She is alert and oriented to person, place, and time. She has normal strength. No cranial nerve deficit or sensory deficit. GCS score is 15. GCS eye subscore is 4. GCS verbal subscore is 5. GCS motor subscore is 6.   Skin: Skin is warm and dry. Capillary refill takes less than 2 seconds. No rash noted.   Psychiatric: She has a normal mood and affect.         ED Course   Procedures  Labs Reviewed   CBC W/ AUTO DIFFERENTIAL - Abnormal; Notable for the following components:       Result Value    MCH 32.9 (*)     Gran % 82.2 (*)     Lymph % 13.3 (*)     Mono % 3.5 (*)     All other components within normal limits   URINALYSIS, REFLEX TO URINE CULTURE - Abnormal; Notable for the following components:    Color, UA Colorless (*)     All other components within normal limits    Narrative:     Specimen Source->Urine   COMPREHENSIVE METABOLIC PANEL   LIPASE          Imaging Results              CT Abdomen Pelvis With IV Contrast NO Oral Contrast (Final result)  Result time 05/07/24 10:54:39      Final result by Hansa Benavides MD (05/07/24 10:54:39)                   Impression:      There is diverticulosis.  Questionable short segment of mild circumferential wall thickening versus incomplete distention and recommend correlation clinically particularly if clinical consideration for mild acute diverticulitis.  Trace free fluid the pelvis although nonspecific is not more so than  can be seen on a physiologic basis.    Additional findings as detailed above including normal appearance of the appendix.  Prior hysterectomy.      Electronically signed by: Hansa Benavides MD  Date:    05/07/2024  Time:    10:54               Narrative:    EXAMINATION:  CT ABDOMEN PELVIS WITH IV CONTRAST    CLINICAL HISTORY:  LLQ abdominal pain;    TECHNIQUE:  Low dose axial images, sagittal and coronal reformations were obtained from the lung bases to the pubic symphysis following the IV administration of 35 mL of Omnipaque 350 and no p.o. contrast    COMPARISON:  11/24/2023    FINDINGS:  Visualized lung bases are essentially clear    Liver, spleen, adrenal glands, pancreas unremarkable appearance.  No calcified stones in the gallbladder or CT findings of acute cholecystitis and no biliary duct dilatation.  Renal enhancement is symmetrical and there is no hydronephrosis.  The urinary bladder is mildly distended at time of the exam and as visualized is unremarkable in appearance.    Very tiny fat containing umbilical hernia    More appearance of the appendix.  Diverticulosis severe within the sigmoid colon questionable short segment of mild circumferential wall thickening sigmoid colon for example axial series 2, images 130-134 versus incomplete distention.  No surrounding fat stranding or fluid.  There is trace free fluid more caudally down low posteriorly in the pelvis which although nonspecific is not more so than can be seen on a physiologic basis    Prior hysterectomy.  Adnexal region unremarkable in appearance    No longer the fat stranding that was seen the prior exam                                       Medications   sodium chloride 0.9% bolus 1,000 mL 1,000 mL (0 mLs Intravenous Stopped 5/7/24 1236)   ketorolac injection 15 mg (15 mg Intravenous Given 5/7/24 1106)   ondansetron injection 4 mg (4 mg Intravenous Given 5/7/24 1106)   iohexoL (OMNIPAQUE 350) 350 mg iodine/mL injection (75 mLs Intravenous Given  5/7/24 1043)   sodium chloride 0.9% bolus 1,000 mL 1,000 mL (0 mLs Intravenous Stopped 5/7/24 1358)     Medical Decision Making  Patient likely has diverticulitis that is amenable to oral antibiotics.  Patient has no peritoneal signs or signs of perforation. Patient's symptoms not typical for other emergent causes of abdominal pain such as, but not limited to, appendicitis, abdominal aortic aneurysm, surgical biliary disease, acute coronary syndrome, etc.    Given rx for oral antibiotics. Patient will be discharged with strict return precautions and follow up with primary MD within 12-24 hours for further evaluation.  Patient understands that they may require admission and IV antibiotics and possibly surgery if they do not improve with oral antibiotics.        Amount and/or Complexity of Data Reviewed  Labs: ordered.  Radiology: ordered. Decision-making details documented in ED Course.    Risk  Prescription drug management.               ED Course as of 05/07/24 1636   Tue May 07, 2024   0921 EKG individually interpreted by me shows sinus tachycardia with a rate of 129.  Normal intervals.  Normal axis.  No STEMI.   [BD]   1101 CT Abdomen Pelvis With IV Contrast NO Oral Contrast [BD]   1102 Impression:     There is diverticulosis.  Questionable short segment of mild circumferential wall thickening versus incomplete distention and recommend correlation clinically particularly if clinical consideration for mild acute diverticulitis.  Trace free fluid the pelvis although nonspecific is not more so than can be seen on a physiologic basis.     Additional findings as detailed above including normal appearance of the appendix.  Prior hysterectomy.        Electronically signed by:Hansa Benavides MD  Date:                                            05/07/2024  Time:                                           10:54   [BD]      ED Course User Index  [BD] Neri Funez MD                           Clinical Impression:  Final  diagnoses:  [R00.0] Tachycardia  [R10.32] Left lower quadrant abdominal pain (Primary)  [K57.92] Diverticulitis          ED Disposition Condition    Discharge Stable          ED Prescriptions       Medication Sig Dispense Start Date End Date Auth. Provider    amoxicillin-clavulanate 875-125mg (AUGMENTIN) 875-125 mg per tablet Take 1 tablet by mouth 2 (two) times daily. for 10 days 20 tablet 5/7/2024 5/17/2024 Neri Funez MD    ondansetron (ZOFRAN-ODT) 4 MG TbDL Take 1 tablet (4 mg total) by mouth every 6 (six) hours as needed (nausea). 12 tablet 5/7/2024 -- Neri Funez MD          Follow-up Information       Follow up With Specialties Details Why Contact Info Additional Information    Gi Saxena MD Family Medicine Go in 2 days  [address] [place] [address] [phone]       [facility] -  Emergency Medicine Go to  As needed, If symptoms worsen [address] [place] [address] 1st floor             Neri Funez MD  05/07/24 8767

## 2024-05-15 ENCOUNTER — E-VISIT (OUTPATIENT)
Dept: FAMILY MEDICINE | Facility: CLINIC | Age: 39
End: 2024-05-15
Payer: COMMERCIAL

## 2024-05-15 ENCOUNTER — PATIENT MESSAGE (OUTPATIENT)
Dept: FAMILY MEDICINE | Facility: CLINIC | Age: 39
End: 2024-05-15

## 2024-05-15 DIAGNOSIS — R94.31 ABNORMAL EKG: ICD-10-CM

## 2024-05-15 DIAGNOSIS — K57.92 DIVERTICULITIS: Primary | ICD-10-CM

## 2024-05-15 DIAGNOSIS — E53.8 LOW SERUM VITAMIN B12: ICD-10-CM

## 2024-05-15 LAB
OHS QRS DURATION: 88 MS
OHS QTC CALCULATION: 433 MS

## 2024-05-15 PROCEDURE — 99422 OL DIG E/M SVC 11-20 MIN: CPT | Mod: ,,, | Performed by: FAMILY MEDICINE

## 2024-05-15 NOTE — PROGRESS NOTES
"Labs/Tests:  CBC:  Lab Results   Component Value Date    WBC 8.50 05/07/2024    RBC 4.16 05/07/2024    HGB 13.7 05/07/2024    HCT 39.1 05/07/2024    MCV 94 05/07/2024    MCH 32.9 (H) 05/07/2024    MCHC 35.0 05/07/2024    RDW 11.9 05/07/2024     05/07/2024    MPV 9.6 05/07/2024    GRAN 7.0 05/07/2024    GRAN 82.2 (H) 05/07/2024    LYMPH 1.1 05/07/2024    LYMPH 13.3 (L) 05/07/2024    MONO 0.3 05/07/2024    MONO 3.5 (L) 05/07/2024    EOS 0.0 05/07/2024    BASO 0.06 05/07/2024    EOSINOPHIL 0.1 05/07/2024    BASOPHIL 0.7 05/07/2024    DIFFMETHOD Automated 05/07/2024     CMP:  Lab Results   Component Value Date     05/07/2024    K 3.9 05/07/2024     05/07/2024    CO2 27 05/07/2024    BUN 8 05/07/2024    CREATININE 0.8 05/07/2024     05/07/2024    CALCIUM 9.2 05/07/2024    ALKPHOS 84 05/07/2024    PROT 7.1 05/07/2024    ALBUMIN 3.8 05/07/2024    BILITOT 0.5 05/07/2024    AST 12 05/07/2024    ALT 11 05/07/2024    ANIONGAP 8 05/07/2024    EGFRNORACEVR >60 05/07/2024     LIPIDS:  Lab Results   Component Value Date    CHOL 198 12/27/2023    TRIG 117 12/27/2023    HDL 44 12/27/2023    LDLCALC 130.6 12/27/2023    CHOLHDL 22.2 12/27/2023    TOTALCHOLEST 4.5 12/27/2023    NONHDLCHOL 154 12/27/2023     Magnesium:  No results found for: "MG"  MicroAlbumin/Creatinine Ratio, Urine:  No results found for: "LABMICR", "CREATRANDUR", "MICALBCREAT"  Iron / TIBC:  Lab Results   Component Value Date    IRON 81 05/10/2023    TRANSFERRIN 173 (L) 05/10/2023    TIBC 256 05/10/2023    FESATURATED 32 05/10/2023       Vit B / Vit D:  Lab Results   Component Value Date    OKDKTFJA09 363 05/10/2023     UA:  Lab Results   Component Value Date    COLORU Colorless (A) 05/07/2024    APPEARANCEUA Clear 05/07/2024    PHUR 7.0 05/07/2024    SPECGRAV 1.005 05/07/2024    PROTEINUA Negative 05/07/2024    GLUCUA Negative 05/07/2024    KETONESU Negative 05/07/2024    BILIRUBINUA Negative 05/07/2024    OCCULTUA Negative 05/07/2024    " NITRITE Negative 05/07/2024    UROBILINOGEN Negative 05/07/2024    LEUKOCYTESUR Negative 05/07/2024     UA Reflex w/ Culture:  Lab Results   Component Value Date    LEUKOCYTESUR Negative 05/07/2024    UROBILINOGEN Negative 05/07/2024    BACTERIA Few (A) 11/24/2023    RBCUA 2 11/24/2023    WBCUA 7 (H) 11/24/2023           Patient ID: Diann Buitrago is a 38 y.o. female.    Chief Complaint: Follow-up    The patient initiated a request through [employer] on 5/15/2024 for evaluation and management with a chief complaint of Follow-up     I evaluated the questionnaire submission on 5/15/2024.    Ohs Peq Evisit General    5/15/2024  2:04 PM CDT - Filed by Patient   Do you agree to participate in an E-Visit? Yes   If you have any of the following symptoms, please present to your local ER or call 911:  I acknowledge   What is the main issue you would like addressed today? Follow up from er visit 05/07/24 diverticulitis flare up and need tests resukts reviewed by doctor ekg bloodwork urine ct scans extra all on portal   Are you able to take your vital signs? No   Are you pregnant, could you be pregnant, or are you breast feeding? None of the above   Please describe your symptoms Follow up still taking antibiotics for diverticulitis   Where is your problem located? Nothing new   How severe are your symptoms? Mild   Have you had these symptoms before? Yes   How long have you been having these symptoms? For a week   Please list any medications or treatments you have used for your condition and indicate if it was effective or not. Amoxicillin clav 875-125 and zofran other daily regular medications   What makes this feel better? Follow up   What makes this feel worse? Review tests results slighting still having lower pain in and off   Are these symptoms related to a condition that you currently have? Yes   What is the condition? Diverticulitis mild acute   When were you last seen for this condition? 5/7/2024   Please describe any  probable cause for these symptoms Probably food I ate   Provide any additional information you feel is important. I was concerned on the ekg that just posted about some kind of enlargement and some abnormal labs   Please attach any relevant images or files          Encounter Diagnoses   Name Primary?    Diverticulitis Yes    Abnormal EKG     Low serum vitamin B12         Orders Placed This Encounter   Procedures    CBC Auto Differential     Standing Status:   Future     Standing Expiration Date:   2025    Vitamin B12     Standing Status:   Future     Standing Expiration Date:   2025    SCHEDULED EKG 12-LEAD (to Muse)     Standing Status:   Future     Standing Expiration Date:   5/15/2025        Abnormal EKG was very similar to previous EGD done 2023.  CBC and EKG abnormalities likely due to stress/tachycardia/illness (diverticulitis.)  Recheck in 6-8 weeks after completely recovered from diverticulitis.        Schedule labs above along with Drug Scree-in house before follow up.  Follow up in about 2 months (around 7/15/2024) for f/u anxiety, EKG, lab results.      E-Visit Time Trackin min

## 2024-06-27 ENCOUNTER — PATIENT MESSAGE (OUTPATIENT)
Dept: FAMILY MEDICINE | Facility: CLINIC | Age: 39
End: 2024-06-27

## 2024-06-27 ENCOUNTER — E-VISIT (OUTPATIENT)
Dept: FAMILY MEDICINE | Facility: CLINIC | Age: 39
End: 2024-06-27
Payer: COMMERCIAL

## 2024-06-27 DIAGNOSIS — J32.9 SINUSITIS, UNSPECIFIED CHRONICITY, UNSPECIFIED LOCATION: Primary | ICD-10-CM

## 2024-06-27 DIAGNOSIS — U07.1 COVID-19: ICD-10-CM

## 2024-06-27 RX ORDER — MOMETASONE FUROATE 50 UG/1
2 SPRAY, METERED NASAL DAILY
Qty: 17 G | Refills: 1 | Status: SHIPPED | OUTPATIENT
Start: 2024-06-27

## 2024-06-27 RX ORDER — PREDNISONE 20 MG/1
20 TABLET ORAL DAILY
Qty: 5 TABLET | Refills: 0 | Status: SHIPPED | OUTPATIENT
Start: 2024-06-27 | End: 2024-07-02

## 2024-06-27 NOTE — LETTER
June 28, 2024      [facility]  [address] [place] [address]  Phone: [phone]       Patient: Diann Buitrago   YOB: 1985  Date of Visit: 06/28/2024    To Whom It May Concern:    William Buitrago  was at [facility] on 06/27/2024. The patient may return to work on Monday, July 1, 2024 with no restrictions. If you have any questions or concerns, or if I can be of further assistance, please do not hesitate to contact me.    Sincerely,            Gi Saxena MD

## 2024-06-28 NOTE — PROGRESS NOTES
Patient ID: Diann Buitrago is a 38 y.o. female.    Chief Complaint: URI    The patient initiated a request through [employer] on 6/27/2024 for evaluation and management with a chief complaint of URI     I evaluated the questionnaire submission on 6/27/2024, 6/28/2024.    Ohs Peq Evisit General    6/27/2024  9:14 AM CDT - Filed by Patient   Do you agree to participate in an E-Visit? Yes   If you have any of the following symptoms, please present to your local emergency room or call 911:  I acknowledge   Are you pregnant, could you be pregnant, or are you breast feeding? None of the above   What is the main issue you would like addressed today? headace for days then other syp start appear chills body aches nausea mild vomiting earache sinus pressure in the ears fatigue generaly feeling sick drainage not coughing yet though took a covid test at home yesterday slighly read postive today reads neg   Please describe your symptoms Headace chills nausea mild vomit occasional earache popping sound in the ear fatigue chills drainage body aches not coughing yet generally feeling unwell   Where is your problem located? Mild sore thoart feels like lympsnodes are swollen Took home covid test yesterday read a slight postive covid today reads negative   How severe are your symptoms? Moderate   Have you had these symptoms before? Yes   How long have you been having these symptoms? For a few days   Please list any medications or treatments you have used for your condition and indicate if it was effective or not. Tyenol for discomfort and headache   What makes this feel better? Laying down   What makes this feel worse? Feel the same   Are these symptoms related to a condition that you currently have? No   Please describe any probable cause for these symptoms Possible covid   Provide any additional information you feel is important.    Please attach any relevant images or files    Are you able to take your vital signs? No          Encounter Diagnoses   Name Primary?    Sinusitis, unspecified chronicity, unspecified location Yes    COVID-19         No orders of the defined types were placed in this encounter.     Medications Ordered This Encounter   Medications    mometasone (NASONEX) 50 mcg/actuation nasal spray     Si sprays by Nasal route once daily.     Dispense:  17 g     Refill:  1    predniSONE (DELTASONE) 20 MG tablet     Sig: Take 1 tablet (20 mg total) by mouth once daily. for 5 days     Dispense:  5 tablet     Refill:  0        Follow up if symptoms worsen or fail to improve.      E-Visit Time Trackin minutes

## 2024-07-05 ENCOUNTER — HOSPITAL ENCOUNTER (OUTPATIENT)
Dept: CARDIOLOGY | Facility: HOSPITAL | Age: 39
Discharge: HOME | End: 2024-07-05
Attending: FAMILY MEDICINE
Payer: COMMERCIAL

## 2024-07-05 DIAGNOSIS — R94.31 ABNORMAL EKG: ICD-10-CM

## 2024-07-05 PROCEDURE — 93010 ELECTROCARDIOGRAM REPORT: CPT | Mod: ,,, | Performed by: INTERNAL MEDICINE

## 2024-07-05 PROCEDURE — 93005 ELECTROCARDIOGRAM TRACING: CPT

## 2024-07-08 LAB
OHS QRS DURATION: 82 MS
OHS QTC CALCULATION: 433 MS

## 2024-07-12 ENCOUNTER — TELEPHONE (OUTPATIENT)
Dept: FAMILY MEDICINE | Facility: CLINIC | Age: 39
End: 2024-07-12
Payer: COMMERCIAL

## 2024-07-12 ENCOUNTER — OFFICE VISIT (OUTPATIENT)
Dept: FAMILY MEDICINE | Facility: CLINIC | Age: 39
End: 2024-07-12
Payer: COMMERCIAL

## 2024-07-12 DIAGNOSIS — Z76.0 MEDICATION REFILL: ICD-10-CM

## 2024-07-12 DIAGNOSIS — Z86.16 HISTORY OF COVID-19: ICD-10-CM

## 2024-07-12 DIAGNOSIS — F41.1 GENERALIZED ANXIETY DISORDER WITH PANIC ATTACKS: Primary | ICD-10-CM

## 2024-07-12 DIAGNOSIS — Z92.29 HISTORY OF REGULAR MEDICATION USE: ICD-10-CM

## 2024-07-12 DIAGNOSIS — F41.0 GENERALIZED ANXIETY DISORDER WITH PANIC ATTACKS: Primary | ICD-10-CM

## 2024-07-12 DIAGNOSIS — Z86.718 HISTORY OF DEEP VENOUS THROMBOSIS (DVT) OF DISTAL VEIN OF LEFT LOWER EXTREMITY: ICD-10-CM

## 2024-07-12 DIAGNOSIS — F42.9 OBSESSIVE-COMPULSIVE DISORDER, UNSPECIFIED TYPE: ICD-10-CM

## 2024-07-12 DIAGNOSIS — H92.02 OTALGIA OF LEFT EAR: ICD-10-CM

## 2024-07-12 PROCEDURE — G2211 COMPLEX E/M VISIT ADD ON: HCPCS | Mod: 95,,, | Performed by: FAMILY MEDICINE

## 2024-07-12 PROCEDURE — 1159F MED LIST DOCD IN RCRD: CPT | Mod: 95,,, | Performed by: FAMILY MEDICINE

## 2024-07-12 PROCEDURE — 99214 OFFICE O/P EST MOD 30 MIN: CPT | Mod: 95,,, | Performed by: FAMILY MEDICINE

## 2024-07-12 PROCEDURE — 1160F RVW MEDS BY RX/DR IN RCRD: CPT | Mod: 95,,, | Performed by: FAMILY MEDICINE

## 2024-07-12 RX ORDER — NEOMYCIN SULFATE, POLYMYXIN B SULFATE AND DEXAMETHASONE 3.5; 10000; 1 MG/ML; [USP'U]/ML; MG/ML
2 SUSPENSION/ DROPS OPHTHALMIC EVERY 4 HOURS
Qty: 5 ML | Refills: 0 | Status: SHIPPED | OUTPATIENT
Start: 2024-07-12

## 2024-07-12 RX ORDER — NEOMYCIN SULFATE, POLYMYXIN B SULFATE AND DEXAMETHASONE 3.5; 10000; 1 MG/ML; [USP'U]/ML; MG/ML
2 SUSPENSION/ DROPS OPHTHALMIC EVERY 4 HOURS
Qty: 5 ML | Refills: 0 | Status: SHIPPED | OUTPATIENT
Start: 2024-07-12 | End: 2024-07-12

## 2024-07-12 RX ORDER — ALPRAZOLAM 1 MG/1
1 TABLET ORAL 2 TIMES DAILY PRN
Qty: 60 TABLET | Refills: 2 | Status: SHIPPED | OUTPATIENT
Start: 2024-07-12 | End: 2024-10-10

## 2024-07-12 NOTE — PROGRESS NOTES
The patient location is: [place]  The chief complaint leading to consultation is: VV for follow up COVID    Visit type: audiovisual    Face to Face time with patient: 23 minutes  30 minutes of total time spent on the encounter, which includes face to face time and non-face to face time preparing to see the patient (eg, review of tests), Obtaining and/or reviewing separately obtained history, Documenting clinical information in the electronic or other health record, Independently interpreting results (not separately reported) and communicating results to the patient/family/caregiver, or Care coordination (not separately reported).         Each patient to whom he or she provides medical services by telemedicine is:  (1) informed of the relationship between the physician and patient and the respective role of any other health care provider with respect to management of the patient; and (2) notified that he or she may decline to receive medical services by telemedicine and may withdraw from such care at any time.    Notes:     Subjective:       Patient ID: Diann Buitrago is a 38 y.o. female.    Chief Complaint: Follow-up    Lab follow up via VV--Recent labs resulted in Epic were reviewed in detail with patient and all questions answered to his/her satisfaction.    Pharmacogenomics panel not collected. Pt with hx of medication failure (BALA, OCD, panic d/o.)    UDS appropriate, pt due for refill alprazolam. Takes 0.5mg to 1mg BID prn anxiety, typically 1.5mg per day on average, some days takes the full 2mg per day.    Positive for Covid a couple of weeks ago, finally starting to feel some better. Still tired, run down, and with L ear pain. Worse after getting water in the ear during shower yesterday.    Follow-up  Pertinent negatives include no arthralgias, chest pain, headaches, joint swelling, neck pain, vomiting or weakness.         5/10/2023     8:51 AM   Depression Patient Health Questionnaire   Over the last two weeks  how often have you been bothered by little interest or pleasure in doing things Not at all   Over the last two weeks how often have you been bothered by feeling down, depressed or hopeless Not at all   PHQ-2 Total Score 0          No data to display                Review of Systems   Constitutional:  Positive for activity change. Negative for unexpected weight change.   HENT:  Positive for ear pain and rhinorrhea. Negative for hearing loss and trouble swallowing.    Eyes:  Negative for discharge and visual disturbance.   Respiratory:  Negative for chest tightness and wheezing.    Cardiovascular:  Negative for chest pain and palpitations.   Gastrointestinal:  Positive for diarrhea. Negative for blood in stool, constipation and vomiting.   Endocrine: Negative for polydipsia and polyuria.   Genitourinary:  Negative for difficulty urinating, dysuria, hematuria and menstrual problem.   Musculoskeletal:  Negative for arthralgias, joint swelling and neck pain.   Neurological:  Negative for weakness and headaches.   Psychiatric/Behavioral:  Negative for confusion and dysphoric mood.    All other systems reviewed and are negative.        Past Medical History:   Diagnosis Date    Endometriosis, unspecified     GERD (gastroesophageal reflux disease)      Past Surgical History:   Procedure Laterality Date    2 csections      hysterectomy 2014       Family History   Problem Relation Name Age of Onset    Hypertension Mother      Heart disease Mother         Review of patient's allergies indicates:   Allergen Reactions    Sulfa (sulfonamide antibiotics) Hives       Current Outpatient Medications:     ALPRAZolam (XANAX) 1 MG tablet, Take 1 tablet (1 mg total) by mouth 2 (two) times daily as needed for Anxiety., Disp: 60 tablet, Rfl: 2    amoxicillin-clavulanate 875-125mg (AUGMENTIN) 875-125 mg per tablet, Take 1 tablet by mouth every 12 (twelve) hours. for 10 days, Disp: 20 tablet, Rfl: 0    esomeprazole (NEXIUM) 20 MG capsule,  Take 1 capsule by mouth 2 (two) times a day., Disp: , Rfl:     loratadine (CLARITIN) 10 mg tablet, Take 1 tablet (10 mg total) by mouth 2 (two) times a day. For allergy symptoms, Disp: 30 tablet, Rfl: 0    mometasone (NASONEX) 50 mcg/actuation nasal spray, 2 sprays by Nasal route once daily., Disp: 17 g, Rfl: 1    neomycin-polymyxin-dexamethasone (MAXITROL) 3.5mg/mL-10,000 unit/mL-0.1 % DrpS, Place 2 drops into the left eye every 4 (four) hours., Disp: 5 mL, Rfl: 0    ondansetron (ZOFRAN) 4 MG tablet, Take 1 tablet (4 mg total) by mouth every 6 (six) hours as needed for Nausea., Disp: 30 tablet, Rfl: 0    ondansetron (ZOFRAN-ODT) 4 MG TbDL, Take 1 tablet (4 mg total) by mouth every 6 (six) hours as needed (nausea)., Disp: 12 tablet, Rfl: 0      Objective:      There were no vitals taken for this visit.  Physical Exam  Constitutional:       General: She is not in acute distress.     Appearance: She is not toxic-appearing.   Pulmonary:      Effort: Pulmonary effort is normal. No respiratory distress.   Neurological:      Mental Status: She is alert and oriented to person, place, and time.   Psychiatric:         Mood and Affect: Mood normal.         Behavior: Behavior normal.         Thought Content: Thought content normal.         Judgment: Judgment normal.         Assessment:       1. Generalized anxiety disorder with panic attacks    2. Otalgia of left ear    3. History of COVID-19    4. Medication refill    5. Obsessive-compulsive disorder, unspecified type    6. History of regular medication use    7. History of deep venous thrombosis (DVT) of distal vein of left lower extremity        Plan:       Generalized anxiety disorder with panic attacks  -     ALPRAZolam (XANAX) 1 MG tablet; Take 1 tablet (1 mg total) by mouth 2 (two) times daily as needed for Anxiety.  Dispense: 60 tablet; Refill: 2  -     Pharmacogenomics Panel; Future; Expected date: 07/12/2024  -     Consult to Pharmacogenomics; Future    Otalgia of  left ear  -     Discontinue: neomycin-polymyxin-dexamethasone (MAXITROL) 3.5mg/mL-10,000 unit/mL-0.1 % DrpS; Place 2 drops into the left eye every 4 (four) hours.  Dispense: 5 mL; Refill: 0  -     neomycin-polymyxin-dexamethasone (MAXITROL) 3.5mg/mL-10,000 unit/mL-0.1 % DrpS; Place 2 drops into the left eye every 4 (four) hours.  Dispense: 5 mL; Refill: 0    History of COVID-19    Medication refill  -     ALPRAZolam (XANAX) 1 MG tablet; Take 1 tablet (1 mg total) by mouth 2 (two) times daily as needed for Anxiety.  Dispense: 60 tablet; Refill: 2    Obsessive-compulsive disorder, unspecified type  -     Pharmacogenomics Panel; Future; Expected date: 07/12/2024  -     Consult to Pharmacogenomics; Future    History of regular medication use  -     Pharmacogenomics Panel; Future; Expected date: 07/12/2024  -     Consult to Pharmacogenomics; Future    History of deep venous thrombosis (DVT) of distal vein of left lower extremity  Comments:  2015, was taking topical progesterone and vaginal estrogen while smoking; d/c smoking at time of DVT dx.    Pharmacogenomics panel ordered along with pharmacogenomics consult due to hx of medication failure for tx of anxiety with panic attacks, OCD.  [place]  reviewed and approrpriate. UDS reviewed and appropriate. Alprazolam refilled x 3 months.  Maxitrol drops for ear pain. Complete Augmentin.  Okay to continue [employer] daily.  Will schedule follow up pending pharmacogenomics panel results.          Previous records in Epic were reviewed, including the last 3 months of encounters, imaging, laboratory, and pathology reports.    Strict return precautions reviewed and patient verbalized understanding. Risks, benefits, and alternatives to the plan were reviewed in detail and all questions answered to the patient's satisfaction. Patient agrees to return to clinic or ER if symptoms worsen. 30 minutes total were spent on today's visit, not limited to but including time based on counseling  and coordination of care.    Patient instructed that best way to communicate with my office staff is for patient to get on the [facility] epic patient portal to expedite communication and communication issues that may occur.  Patient was given instructions on how to get on the portal.  I encouraged patient to obtain portal access as well.  Ultimately it is up to the patient to obtain access.  Patient voiced understanding.    This note was created using "[employer] voice recognition software that occasionally may misinterpret phrases or words.    Follow up in about 3 months (around 10/12/2024) for medication refill; sooner visit pending test results (pharmacogenomics panel).

## 2024-07-12 NOTE — TELEPHONE ENCOUNTER
----- Message from Gi Saxena MD sent at 7/12/2024  1:49 PM CDT -----  Please schedule follow up in 3-3.5 months for medication refill in person preferably but okay for VV if no in person available

## 2024-07-12 NOTE — Clinical Note
Please schedule follow up in 3-3.5 months for medication refill in person preferably but okay for VV if no in person available

## 2024-07-29 ENCOUNTER — PATIENT MESSAGE (OUTPATIENT)
Dept: FAMILY MEDICINE | Facility: CLINIC | Age: 39
End: 2024-07-29
Payer: COMMERCIAL

## 2024-08-05 ENCOUNTER — E-VISIT (OUTPATIENT)
Dept: FAMILY MEDICINE | Facility: CLINIC | Age: 39
End: 2024-08-05
Payer: COMMERCIAL

## 2024-08-05 DIAGNOSIS — Z86.16 HISTORY OF COVID-19: ICD-10-CM

## 2024-08-05 DIAGNOSIS — H66.90 OTITIS, UNSPECIFIED LATERALITY: ICD-10-CM

## 2024-08-05 DIAGNOSIS — H92.02 OTALGIA OF LEFT EAR: ICD-10-CM

## 2024-08-05 DIAGNOSIS — H93.12 TINNITUS OF LEFT EAR: Primary | ICD-10-CM

## 2024-08-08 RX ORDER — CLINDAMYCIN HYDROCHLORIDE 300 MG/1
300 CAPSULE ORAL EVERY 8 HOURS
Qty: 30 CAPSULE | Refills: 0 | Status: SHIPPED | OUTPATIENT
Start: 2024-08-08 | End: 2024-08-18

## 2024-10-14 ENCOUNTER — OFFICE VISIT (OUTPATIENT)
Dept: FAMILY MEDICINE | Facility: CLINIC | Age: 39
End: 2024-10-14

## 2024-10-14 VITALS
OXYGEN SATURATION: 99 % | SYSTOLIC BLOOD PRESSURE: 118 MMHG | WEIGHT: 127.31 LBS | DIASTOLIC BLOOD PRESSURE: 64 MMHG | BODY MASS INDEX: 21.74 KG/M2 | HEIGHT: 64 IN | HEART RATE: 110 BPM

## 2024-10-14 DIAGNOSIS — F41.0 GENERALIZED ANXIETY DISORDER WITH PANIC ATTACKS: ICD-10-CM

## 2024-10-14 DIAGNOSIS — H66.90 OTITIS, UNSPECIFIED LATERALITY: Primary | ICD-10-CM

## 2024-10-14 DIAGNOSIS — F41.1 GENERALIZED ANXIETY DISORDER WITH PANIC ATTACKS: ICD-10-CM

## 2024-10-14 DIAGNOSIS — Z76.0 MEDICATION REFILL: ICD-10-CM

## 2024-10-14 DIAGNOSIS — Z13.220 SCREENING, LIPID: ICD-10-CM

## 2024-10-14 DIAGNOSIS — Z79.899 LONG-TERM CURRENT USE OF BENZODIAZEPINE: ICD-10-CM

## 2024-10-14 PROCEDURE — 99214 OFFICE O/P EST MOD 30 MIN: CPT | Mod: PBBFAC,PN | Performed by: FAMILY MEDICINE

## 2024-10-14 PROCEDURE — 99999 PR PBB SHADOW E&M-EST. PATIENT-LVL IV: CPT | Mod: PBBFAC,,, | Performed by: FAMILY MEDICINE

## 2024-10-14 PROCEDURE — 99214 OFFICE O/P EST MOD 30 MIN: CPT | Mod: S$PBB,,, | Performed by: FAMILY MEDICINE

## 2024-10-14 RX ORDER — IBUPROFEN 800 MG/1
800 TABLET ORAL EVERY 6 HOURS PRN
COMMUNITY
Start: 2024-10-09

## 2024-10-14 RX ORDER — AMOXICILLIN 500 MG/1
500 CAPSULE ORAL 3 TIMES DAILY
COMMUNITY
Start: 2024-10-09

## 2024-10-14 NOTE — PROGRESS NOTES
Subjective:       Patient ID: Diann Buitrago is a 38 y.o. female.    Chief Complaint: Annual Exam (Pt is here for her annual check up)    Annual checkup.    Otalgia--L and R ear hurt, seems they alternate but L ear worst.    BALA with panic attacks--has tried and failed numerous medications. PGX panel ordered. Patient uses Xanax to help with anxiety and OCD. Due for refill.    HM--decline flu hot. UTD on pap smear.    Due for screening labs.          10/14/2024     1:41 PM 5/10/2023     8:51 AM   Depression Patient Health Questionnaire   Over the last two weeks how often have you been bothered by little interest or pleasure in doing things Not at all Not at all   Over the last two weeks how often have you been bothered by feeling down, depressed or hopeless Not at all Not at all   PHQ-2 Total Score 0 0          No data to display                Review of Systems   All other systems reviewed and are negative.        Past Medical History:   Diagnosis Date    Endometriosis, unspecified     GERD (gastroesophageal reflux disease)      Past Surgical History:   Procedure Laterality Date    2 csections       SECTION  2009    EYE SURGERY  2007    Lasic    HYSTERECTOMY  2016    hysterectomy 2014       Family History   Problem Relation Name Age of Onset    Hypertension Mother Alma garrison     Heart disease Mother Alma garrison        Review of patient's allergies indicates:   Allergen Reactions    Sulfa (sulfonamide antibiotics) Hives       Current Outpatient Medications:     amoxicillin (AMOXIL) 500 MG capsule, Take 500 mg by mouth 3 (three) times daily., Disp: , Rfl:     esomeprazole (NEXIUM) 20 MG capsule, Take 1 capsule by mouth 2 (two) times a day., Disp: , Rfl:     ibuprofen (ADVIL,MOTRIN) 800 MG tablet, Take 800 mg by mouth every 6 (six) hours as needed., Disp: , Rfl:     loratadine (CLARITIN) 10 mg tablet, Take 1 tablet (10 mg total) by mouth 2 (two) times a day. For allergy symptoms, Disp: 30 tablet, Rfl: 0     "mometasone (NASONEX) 50 mcg/actuation nasal spray, 2 sprays by Nasal route once daily., Disp: 17 g, Rfl: 1    neomycin-polymyxin-dexamethasone (MAXITROL) 3.5mg/mL-10,000 unit/mL-0.1 % DrpS, Place 2 drops into the left eye every 4 (four) hours., Disp: 5 mL, Rfl: 0    ondansetron (ZOFRAN) 4 MG tablet, Take 1 tablet (4 mg total) by mouth every 6 (six) hours as needed for Nausea., Disp: 30 tablet, Rfl: 0    ondansetron (ZOFRAN-ODT) 4 MG TbDL, Take 1 tablet (4 mg total) by mouth every 6 (six) hours as needed (nausea)., Disp: 12 tablet, Rfl: 0    ALPRAZolam (XANAX) 1 MG tablet, Take 1 tablet (1 mg total) by mouth 2 (two) times daily as needed for Anxiety., Disp: 60 tablet, Rfl: 2      Objective:      /64   Pulse 110   Ht 5' 4" (1.626 m)   Wt 57.7 kg (127 lb 4.8 oz)   SpO2 99%   BMI 21.85 kg/m²   Physical Exam  Vitals and nursing note reviewed.   Constitutional:       General: She is not in acute distress.     Appearance: Normal appearance. She is not ill-appearing, toxic-appearing or diaphoretic.   HENT:      Mouth/Throat:      Mouth: Mucous membranes are moist.   Eyes:      General: No scleral icterus.        Right eye: No discharge.         Left eye: No discharge.   Neck:      Vascular: No carotid bruit.   Cardiovascular:      Rate and Rhythm: Regular rhythm. Tachycardia present.      Heart sounds: Normal heart sounds.      Comments: Tachy at 106 on repeat exam.  Pulmonary:      Effort: Pulmonary effort is normal. No respiratory distress.      Breath sounds: Normal breath sounds. No wheezing.   Abdominal:      General: There is no distension.   Musculoskeletal:      Cervical back: Neck supple.      Right lower leg: No edema.      Left lower leg: No edema.   Lymphadenopathy:      Cervical: No cervical adenopathy.   Skin:     General: Skin is warm and dry.      Capillary Refill: Capillary refill takes less than 2 seconds.      Coloration: Skin is not jaundiced or pale.   Neurological:      General: No focal " deficit present.      Mental Status: She is alert and oriented to person, place, and time. Mental status is at baseline.   Psychiatric:         Mood and Affect: Mood normal.         Behavior: Behavior normal.         Thought Content: Thought content normal.         Judgment: Judgment normal.         Assessment:       1. Otitis, unspecified laterality    2. Medication refill    3. Generalized anxiety disorder with panic attacks    4. Long-term current use of benzodiazepine    5. Screening, lipid        Plan:       Otitis, unspecified laterality  -     Ambulatory referral/consult to ENT; Future; Expected date: 10/21/2024    Medication refill  -     ALPRAZolam (XANAX) 1 MG tablet; Take 1 tablet (1 mg total) by mouth 2 (two) times daily as needed for Anxiety.  Dispense: 60 tablet; Refill: 2    Generalized anxiety disorder with panic attacks  -     ALPRAZolam (XANAX) 1 MG tablet; Take 1 tablet (1 mg total) by mouth 2 (two) times daily as needed for Anxiety.  Dispense: 60 tablet; Refill: 2    Long-term current use of benzodiazepine  -     CBC Auto Differential; Future; Expected date: 10/14/2024  -     Comprehensive Metabolic Panel; Future; Expected date: 10/14/2024    Screening, lipid  -     Lipid Panel; Future; Expected date: 10/14/2024    [place]  reviewed and appropriate.  Paperwork ha been signed.  UDS on file.          Previous records in Epic were reviewed, including the last 3 months of encounters, imaging, laboratory, and pathology reports.    Strict return precautions reviewed and patient verbalized understanding. Risks, benefits, and alternatives to the plan were reviewed in detail and all questions answered to the patient's satisfaction. Patient agrees to return to clinic or ER if symptoms worsen. 30 minutes total were spent on today's visit, not limited to but including time based on counseling and coordination of care.    Patient instructed that best way to communicate with my office staff is for patient to get  on the [facility] patient portal to expedite communication and communication issues that may occur.  Patient was given instructions on how to get on the portal.  I encouraged patient to obtain portal access as well.  Ultimately it is up to the patient to obtain access.  Patient voiced understanding.    This note was created using [employer] voice recognition software that occasionally may misinterpret phrases or words.    Follow up in about 3 months (around 1/14/2025) for med check/anxiety/refills.

## 2024-10-20 RX ORDER — ALPRAZOLAM 1 MG/1
1 TABLET ORAL 2 TIMES DAILY PRN
Qty: 60 TABLET | Refills: 2 | Status: SHIPPED | OUTPATIENT
Start: 2024-10-20 | End: 2025-01-18

## 2025-01-09 ENCOUNTER — E-VISIT (OUTPATIENT)
Dept: FAMILY MEDICINE | Facility: CLINIC | Age: 40
End: 2025-01-09

## 2025-01-09 DIAGNOSIS — J01.40 ACUTE PANSINUSITIS, RECURRENCE NOT SPECIFIED: Primary | ICD-10-CM

## 2025-01-09 PROCEDURE — 99421 OL DIG E/M SVC 5-10 MIN: CPT | Mod: ,,, | Performed by: FAMILY MEDICINE

## 2025-01-09 RX ORDER — CLINDAMYCIN HYDROCHLORIDE 300 MG/1
300 CAPSULE ORAL EVERY 8 HOURS
Qty: 30 CAPSULE | Refills: 0 | Status: SHIPPED | OUTPATIENT
Start: 2025-01-09

## 2025-01-13 NOTE — PROGRESS NOTES
Patient ID: Diann Buitrago is a 39 y.o. female.    Chief Complaint: General Illness (Entered automatically based on patient selection in [employer].)    The patient initiated a request through [employer] on 1/9/2025 for evaluation and management with a chief complaint of General Illness (Entered automatically based on patient selection in [employer].)     I evaluated the questionnaire submission on 1/9/2025, 1/13/2025.    Ohs Peq Evisit Supergroup-Cough And Cold    1/9/2025 10:51 AM CST - Filed by Patient   What do you need help with? Sinus Infection   Do you agree to participate in an E-Visit? Yes   If you have any of the following symptoms, go to your local emergency room or call 911: I acknowledge   Do you have any of the following pregnancy-related conditions? None   What is the main issue you would like addressed today? Symptoms 13 days not clearing up allot of dinus drainage coning out the nostrils clear yellow black stripes thoart draige with cough causing voice to go in and out ear clogging and popping from nostrils draining no fever slight sinus pressure headache occ   Do you think you might have COVID or the Flu? No   Have you tested positive for COVID or Flu? No   What symptoms do you currently have?  Cough;  Headache;  Nasal Congestion;  Runny nose;  Sore throat;  Pain around the nose and face;  Ear pain   Describe your cough: Productive (containing mucus)   Describe the mucus: Yellow;  Clear;  White   Have you had any of the following? None of the above   Have you ever smoked? I smoked in the past   Have you had a fever? No   When did your symptoms first appear? 12/28/2024   In the last two weeks, have you been in close contact with someone who has COVID-19 or the Flu? No   List what you have done or taken to help your symptoms. Nasocort nose spray tyenol   How severe are your symptoms? Moderate   Have your symptoms gotten better or worse since they started?  No change   Do you have transportation to get testing  if it is needed and ordered for you at an [facility] location? Yes   Provide any additional information you feel is important. I swabbed myself twice with home covid test they where negative   Please attach any relevant images or files    Are you able to take your vital signs? No         Encounter Diagnosis   Name Primary?    Acute pansinusitis, recurrence not specified Yes        No orders of the defined types were placed in this encounter.     Medications Ordered This Encounter   Medications    clindamycin (CLEOCIN) 300 MG capsule     Sig: Take 1 capsule (300 mg total) by mouth every 8 (eight) hours.     Dispense:  30 capsule     Refill:  0        Follow up if symptoms worsen or fail to improve.      E-Visit Time Trackin minutes

## 2025-01-17 ENCOUNTER — PATIENT MESSAGE (OUTPATIENT)
Dept: FAMILY MEDICINE | Facility: CLINIC | Age: 40
End: 2025-01-17

## 2025-02-12 ENCOUNTER — LAB VISIT (OUTPATIENT)
Dept: LAB | Facility: HOSPITAL | Age: 40
End: 2025-02-12
Attending: FAMILY MEDICINE
Payer: COMMERCIAL

## 2025-02-12 DIAGNOSIS — Z79.899 LONG-TERM CURRENT USE OF BENZODIAZEPINE: ICD-10-CM

## 2025-02-12 DIAGNOSIS — Z13.220 SCREENING, LIPID: ICD-10-CM

## 2025-02-12 LAB
ALBUMIN SERPL BCP-MCNC: 3.8 G/DL (ref 3.5–5.2)
ALP SERPL-CCNC: 101 U/L (ref 40–150)
ALT SERPL W/O P-5'-P-CCNC: 12 U/L (ref 10–44)
ANION GAP SERPL CALC-SCNC: 8 MMOL/L (ref 8–16)
AST SERPL-CCNC: 15 U/L (ref 10–40)
BASOPHILS # BLD AUTO: 0.06 K/UL (ref 0–0.2)
BASOPHILS NFR BLD: 1 % (ref 0–1.9)
BILIRUB SERPL-MCNC: 0.4 MG/DL (ref 0.1–1)
BUN SERPL-MCNC: 11 MG/DL (ref 6–20)
CALCIUM SERPL-MCNC: 9.1 MG/DL (ref 8.7–10.5)
CHLORIDE SERPL-SCNC: 104 MMOL/L (ref 95–110)
CHOLEST SERPL-MCNC: 184 MG/DL (ref 120–199)
CHOLEST/HDLC SERPL: 4.4 {RATIO} (ref 2–5)
CO2 SERPL-SCNC: 27 MMOL/L (ref 23–29)
CREAT SERPL-MCNC: 0.7 MG/DL (ref 0.5–1.4)
DIFFERENTIAL METHOD BLD: ABNORMAL
EOSINOPHIL # BLD AUTO: 0.2 K/UL (ref 0–0.5)
EOSINOPHIL NFR BLD: 2.4 % (ref 0–8)
ERYTHROCYTE [DISTWIDTH] IN BLOOD BY AUTOMATED COUNT: 12.2 % (ref 11.5–14.5)
EST. GFR  (NO RACE VARIABLE): >60 ML/MIN/1.73 M^2
GLUCOSE SERPL-MCNC: 87 MG/DL (ref 70–110)
HCT VFR BLD AUTO: 39.9 % (ref 37–48.5)
HDLC SERPL-MCNC: 42 MG/DL (ref 40–75)
HDLC SERPL: 22.8 % (ref 20–50)
HGB BLD-MCNC: 13.6 G/DL (ref 12–16)
IMM GRANULOCYTES # BLD AUTO: 0.02 K/UL (ref 0–0.04)
IMM GRANULOCYTES NFR BLD AUTO: 0.3 % (ref 0–0.5)
LDLC SERPL CALC-MCNC: 120.6 MG/DL (ref 63–159)
LYMPHOCYTES # BLD AUTO: 1.7 K/UL (ref 1–4.8)
LYMPHOCYTES NFR BLD: 28.2 % (ref 18–48)
MCH RBC QN AUTO: 32.3 PG (ref 27–31)
MCHC RBC AUTO-ENTMCNC: 34.1 G/DL (ref 32–36)
MCV RBC AUTO: 95 FL (ref 82–98)
MONOCYTES # BLD AUTO: 0.4 K/UL (ref 0.3–1)
MONOCYTES NFR BLD: 5.9 % (ref 4–15)
NEUTROPHILS # BLD AUTO: 3.8 K/UL (ref 1.8–7.7)
NEUTROPHILS NFR BLD: 62.2 % (ref 38–73)
NONHDLC SERPL-MCNC: 142 MG/DL
NRBC BLD-RTO: 0 /100 WBC
PLATELET # BLD AUTO: 332 K/UL (ref 150–450)
PMV BLD AUTO: 10 FL (ref 9.2–12.9)
POTASSIUM SERPL-SCNC: 3.8 MMOL/L (ref 3.5–5.1)
PROT SERPL-MCNC: 6.9 G/DL (ref 6–8.4)
RBC # BLD AUTO: 4.21 M/UL (ref 4–5.4)
SODIUM SERPL-SCNC: 139 MMOL/L (ref 136–145)
TRIGL SERPL-MCNC: 107 MG/DL (ref 30–150)
WBC # BLD AUTO: 6.13 K/UL (ref 3.9–12.7)

## 2025-02-12 PROCEDURE — 80061 LIPID PANEL: CPT | Performed by: FAMILY MEDICINE

## 2025-02-12 PROCEDURE — 36415 COLL VENOUS BLD VENIPUNCTURE: CPT | Mod: PO | Performed by: FAMILY MEDICINE

## 2025-02-12 PROCEDURE — 85025 COMPLETE CBC W/AUTO DIFF WBC: CPT | Performed by: FAMILY MEDICINE

## 2025-02-12 PROCEDURE — 80053 COMPREHEN METABOLIC PANEL: CPT | Performed by: FAMILY MEDICINE

## 2025-02-18 ENCOUNTER — LAB VISIT (OUTPATIENT)
Dept: LAB | Facility: HOSPITAL | Age: 40
End: 2025-02-18
Payer: COMMERCIAL

## 2025-02-18 ENCOUNTER — OFFICE VISIT (OUTPATIENT)
Dept: FAMILY MEDICINE | Facility: CLINIC | Age: 40
End: 2025-02-18
Payer: COMMERCIAL

## 2025-02-18 VITALS
OXYGEN SATURATION: 97 % | BODY MASS INDEX: 21.34 KG/M2 | HEART RATE: 63 BPM | SYSTOLIC BLOOD PRESSURE: 120 MMHG | TEMPERATURE: 98 F | WEIGHT: 125 LBS | HEIGHT: 64 IN | DIASTOLIC BLOOD PRESSURE: 80 MMHG

## 2025-02-18 DIAGNOSIS — Z11.3 SCREEN FOR STD (SEXUALLY TRANSMITTED DISEASE): ICD-10-CM

## 2025-02-18 DIAGNOSIS — Z76.0 MEDICATION REFILL: ICD-10-CM

## 2025-02-18 DIAGNOSIS — F41.0 GENERALIZED ANXIETY DISORDER WITH PANIC ATTACKS: ICD-10-CM

## 2025-02-18 DIAGNOSIS — F41.1 GENERALIZED ANXIETY DISORDER WITH PANIC ATTACKS: ICD-10-CM

## 2025-02-18 DIAGNOSIS — F42.9 OBSESSIVE-COMPULSIVE DISORDER, UNSPECIFIED TYPE: ICD-10-CM

## 2025-02-18 DIAGNOSIS — Z00.01 ANNUAL VISIT FOR GENERAL ADULT MEDICAL EXAMINATION WITH ABNORMAL FINDINGS: Primary | ICD-10-CM

## 2025-02-18 DIAGNOSIS — Z13.29 SCREENING FOR THYROID DISORDER: ICD-10-CM

## 2025-02-18 DIAGNOSIS — Z11.4 SCREENING FOR HIV (HUMAN IMMUNODEFICIENCY VIRUS): ICD-10-CM

## 2025-02-18 DIAGNOSIS — L73.9 FOLLICULITIS: ICD-10-CM

## 2025-02-18 DIAGNOSIS — Z79.899 LONG-TERM CURRENT USE OF BENZODIAZEPINE: ICD-10-CM

## 2025-02-18 DIAGNOSIS — Z00.01 ANNUAL VISIT FOR GENERAL ADULT MEDICAL EXAMINATION WITH ABNORMAL FINDINGS: ICD-10-CM

## 2025-02-18 LAB
AMP AMPHETAMINE 1000 NM/ML POC: NEGATIVE
AMPHET+METHAMPHET UR QL: NEGATIVE
BAR BARBITURATES 300 NG/ML POC: NEGATIVE
BARBITURATES UR QL SCN>200 NG/ML: NEGATIVE
BENZODIAZ UR QL SCN>200 NG/ML: NEGATIVE
BUP BUPRENORPHINE 10 NG/ML POC: NEGATIVE
BZE UR QL SCN: NEGATIVE
BZO BENZODIAZEPINES 300 NG/ML POC: ABNORMAL
CANNABINOIDS UR QL SCN: NEGATIVE
COC COCAINE 300 NG/ML POC: NEGATIVE
CREAT UR-MCNC: 35.9 MG/DL (ref 15–325)
CREATININE (CR) POC: 20
CTP QC/QA: YES
MET METHAMPHETAMINE 1000 NG/ML POC: NEGATIVE
METHADONE UR QL SCN>300 NG/ML: NEGATIVE
MOP/OPI300 MORPHINE 300 NG/ML POC: NEGATIVE
MTD METHADONE 300 NG/ML POC: NEGATIVE
OPIATES UR QL SCN: NEGATIVE
OXIDANT (OX) POC: NEGATIVE
OXY OXYCODONE 100 NG/ML POC: NEGATIVE
PCP UR QL SCN>25 NG/ML: NEGATIVE
SPECIFIC GRAVITY (SG) POC: 1.01
TEMPERATURE (°F) POC: 90
THC MARIJUANA 50 NG/ML POC: NEGATIVE
TOXICOLOGY INFORMATION: NORMAL
TSH SERPL DL<=0.005 MIU/L-ACNC: 2.26 UIU/ML (ref 0.4–4)

## 2025-02-18 PROCEDURE — 87389 HIV-1 AG W/HIV-1&-2 AB AG IA: CPT | Performed by: FAMILY MEDICINE

## 2025-02-18 PROCEDURE — 80307 DRUG TEST PRSMV CHEM ANLYZR: CPT | Performed by: FAMILY MEDICINE

## 2025-02-18 PROCEDURE — 87591 N.GONORRHOEAE DNA AMP PROB: CPT | Performed by: FAMILY MEDICINE

## 2025-02-18 PROCEDURE — 84443 ASSAY THYROID STIM HORMONE: CPT | Performed by: FAMILY MEDICINE

## 2025-02-18 PROCEDURE — 36415 COLL VENOUS BLD VENIPUNCTURE: CPT | Performed by: FAMILY MEDICINE

## 2025-02-18 RX ORDER — CEPHALEXIN 500 MG/1
500 CAPSULE ORAL 3 TIMES DAILY
Qty: 21 CAPSULE | Refills: 0 | Status: SHIPPED | OUTPATIENT
Start: 2025-02-18 | End: 2025-02-25

## 2025-02-18 RX ORDER — ALPRAZOLAM 1 MG/1
1 TABLET ORAL 2 TIMES DAILY PRN
Qty: 60 TABLET | Refills: 2 | Status: SHIPPED | OUTPATIENT
Start: 2025-02-18 | End: 2025-05-19

## 2025-02-18 RX ORDER — MUPIROCIN 20 MG/G
OINTMENT TOPICAL 2 TIMES DAILY
Qty: 44 G | Refills: 1 | Status: SHIPPED | OUTPATIENT
Start: 2025-02-18

## 2025-02-18 NOTE — LETTER
February 18, 2025      [facility]  [address] [place] [address]  Phone: [phone]       Patient: Diann Buitrago   YOB: 1985  Date of Visit: 02/18/2025    To Whom It May Concern:    William Buitrago  was at [facility] on 02/18/2025. The patient may return to work/school on 2/19/25 with no restrictions. If you have any questions or concerns, or if I can be of further assistance, please do not hesitate to contact me.    Sincerely,    Gavi Stanton MA

## 2025-02-18 NOTE — PROGRESS NOTES
"Labs/Tests:  CBC:  Lab Results   Component Value Date    WBC 6.13 02/12/2025    RBC 4.21 02/12/2025    HGB 13.6 02/12/2025    HCT 39.9 02/12/2025    MCV 95 02/12/2025    MCH 32.3 (H) 02/12/2025    MCHC 34.1 02/12/2025    RDW 12.2 02/12/2025     02/12/2025    MPV 10.0 02/12/2025    GRAN 3.8 02/12/2025    GRAN 62.2 02/12/2025    LYMPH 1.7 02/12/2025    LYMPH 28.2 02/12/2025    MONO 0.4 02/12/2025    MONO 5.9 02/12/2025    EOS 0.2 02/12/2025    BASO 0.06 02/12/2025    EOSINOPHIL 2.4 02/12/2025    BASOPHIL 1.0 02/12/2025    DIFFMETHOD Automated 02/12/2025     CMP:  Lab Results   Component Value Date     02/12/2025    K 3.8 02/12/2025     02/12/2025    CO2 27 02/12/2025    BUN 11 02/12/2025    CREATININE 0.7 02/12/2025    GLU 87 02/12/2025    CALCIUM 9.1 02/12/2025    ALKPHOS 101 02/12/2025    PROT 6.9 02/12/2025    ALBUMIN 3.8 02/12/2025    BILITOT 0.4 02/12/2025    AST 15 02/12/2025    ALT 12 02/12/2025    ANIONGAP 8 02/12/2025    EGFRNORACEVR >60.0 02/12/2025     HA1C:  No results found for: "HGBA1C", "ESTIMATEDAVG"  LIPIDS:  Lab Results   Component Value Date    CHOL 184 02/12/2025    TRIG 107 02/12/2025    HDL 42 02/12/2025    LDLCALC 120.6 02/12/2025    CHOLHDL 22.8 02/12/2025    TOTALCHOLEST 4.4 02/12/2025    NONHDLCHOL 142 02/12/2025     Magnesium:  No results found for: "MG"  MicroAlbumin/Creatinine Ratio, Urine:  Lab Results   Component Value Date    CREATRANDUR 35.9 02/18/2025     Iron / TIBC:  Lab Results   Component Value Date    IRON 81 05/10/2023    TRANSFERRIN 173 (L) 05/10/2023    TIBC 256 05/10/2023    FESATURATED 32 05/10/2023     TSH / T3 / T4:  Lab Results   Component Value Date    TSH 2.261 02/18/2025     Vit B / Vit D:  Lab Results   Component Value Date    OPRBABAB38 478 07/05/2024     UA:  Lab Results   Component Value Date    COLORU Colorless (A) 05/07/2024    APPEARANCEUA Clear 05/07/2024    PHUR 7.0 05/07/2024    SPECGRAV 1.005 05/07/2024    PROTEINUA Negative 05/07/2024    " GLUCUA Negative 05/07/2024    KETONESU Negative 05/07/2024    BILIRUBINUA Negative 05/07/2024    OCCULTUA Negative 05/07/2024    NITRITE Negative 05/07/2024    UROBILINOGEN Negative 05/07/2024    LEUKOCYTESUR Negative 05/07/2024     UA Reflex w/ Culture:  Lab Results   Component Value Date    LEUKOCYTESUR Negative 05/07/2024    UROBILINOGEN Negative 05/07/2024    BACTERIA Few (A) 11/24/2023    RBCUA 2 11/24/2023    WBCUA 7 (H) 11/24/2023       Subjective:       Patient ID: Diann Buitrago is a 39 y.o. female.    Chief Complaint: Anxiety (Refills on Xanax. ) and Otalgia (Bilateral x 1 month.)    HPI    History of Present Illness    CHIEF COMPLAINT:  Patient presents today for medication check.    ENT:  She reports right ear trauma from an air horn exposure resulting in temporary deafness lasting 15-20 minutes, followed by tinnitus and otorrhea. The pain persisted overnight but resolved the following day. She also reports left ear tenderness with a small bump in the ear canal that preceded recent weather changes. She experiences bilateral ear popping associated with sinus problems, which predates the air horn incident. She had excessive nasal discharge in December and January, which she attributes to workplace influenza exposure.    LABS:  Cholesterol improved compared to previous year. MCHC demonstrates normal RBC values. Liver function tests, kidney function tests, and electrolytes were within normal limits.         Review of Systems   All other systems reviewed and are negative.        Reviewed family, medical, surgical, and social history.    Objective:      Physical Exam    General: No acute distress. Well-developed. Well-nourished.  Eyes: EOMI. Sclerae anicteric.  HENT: Normocephalic. Atraumatic. Nares patent. Moist oral mucosa.  Ears: Eardrum appears normal. No evidence of eardrum rupture. Bilateral EACs clear.  Cardiovascular: Regular rate. Regular rhythm. No murmurs. No rubs. No gallops. Normal S1,  "S2.  Respiratory: Normal respiratory effort. Clear to auscultation bilaterally. No rales. No rhonchi. No wheezing.  Abdomen: Soft. Non-tender. Non-distended. Normoactive bowel sounds.  Musculoskeletal: No  obvious deformity.  Extremities: No lower extremity edema.  Neurological: Alert & oriented x3. No slurred speech. Normal gait.  Psychiatric: Normal mood. Normal affect. Good insight. Good judgment.  Skin: Warm. Dry. No rash.       /80   Pulse 63   Temp 98.2 °F (36.8 °C) (Oral)   Ht 5' 4" (1.626 m)   Wt 56.7 kg (125 lb)   SpO2 97%   BMI 21.46 kg/m²   Physical Exam    Assessment:       1. Annual visit for general adult medical examination with abnormal findings    2. Long-term current use of benzodiazepine    3. Generalized anxiety disorder with panic attacks    4. Medication refill    5. Screening for thyroid disorder    6. Screen for STD (sexually transmitted disease)    7. Screening for HIV (human immunodeficiency virus)    8. Folliculitis        Plan:       Assessment & Plan    IMPRESSION:  - Assessed ear discomfort; observed small bump in ear canal, likely due to clogged pore or minor infection  - Evaluated eardrum integrity following loud noise exposure; no evidence of rupture found  - Considered potential for staph infection; decided to prescribe topical antibiotic as preventive measure  - Assessed sinus congestion; recommended continued use of nasal spray to reduce inflammation and improve drainage    EAR FURUNCLE:  - Examined the patient's ears and noted a small bump inside the right ear canal.  - The eardrum appears intact with no evidence of rupture.  - Assessed that the ear pain is likely due to a small furuncle or clogged pore from cerumen in the ear canal.  - Instructed the patient to apply a warm, moist compress to the affected ear area to increase blood flow and promote healing.  - Directed the patient to cleanse the affected ear area with an alcohol wipe, allowing it to dry before " applying the prescribed ointment.  - Prescribed Mupirocin ointment: Apply to affected ear area using a cotton-tipped applicator, dabbing inside both external auditory canals nightly for 1 week.  - Advised the patient to contact the office if otalgia worsens, spreads, or if other concerning symptoms develop.    CERUMEN ACCUMULATION:  - Noted the presence of cerumen in the ear canal during exam.  - Assessed that the patient's otic discomfort may be related to cerumen accumulation.  - Explained the difference between oily and dry cerumen types and their implications.  - Documented the patient's history of cerumen issues and recent otic discomfort.    SINUS ISSUES AND EUSTACHIAN TUBE DYSFUNCTION:  - Examined the patient's oropharynx and nasal passages.  - Assessed that the patient's symptoms may be related to sinus problems and eustachian tube dysfunction.  - Instructed the patient to continue using nasal spray as directed, despite initial discomfort, to alleviate sinus congestion.  - Recommend continued use of OTC nasal spray: Use as directed to reduce sinus inflammation and improve drainage.  - Elucidated the function of the eustachian tube and its role in otic pressure regulation and drainage.    RESPIRATORY SYMPTOMS:  - Noted the patient's recent history of respiratory symptoms including excessive rhinorrhea and coughing in the workplace.  - Acknowledged the patient's concerns about dust exposure at work.  - Noted the patient's self-reported measures to reduce exposure to dust in the workplace.  - Recommend maintaining a clean work environment, including regular sanitization of keyboard and surrounding areas.    SKIN MICHAEL MANAGEMENT:  - Educated on the presence of normal skin michael, including staph, and its potential to cause opportunistic infections.  - Started Mupirocin ointment: Apply under fingernails and inside nostrils nightly for 1 week.    NOISE-INDUCED AUDITORY EFFECTS:  - Explained the mechanism of  tympanic membrane vibration in response to loud noises and potential temporary auditory effects.    LABS:  - Urine drug screen ordered to be performed during visit.         1. Annual visit for general adult medical examination with abnormal findings  -     TSH; Future; Expected date: 02/18/2025    2. Long-term current use of benzodiazepine;3. Generalized anxiety disorder with panic attacks; 4. Medication refill  -     ALPRAZolam (XANAX) 1 MG tablet; Take 1 tablet (1 mg total) by mouth 2 (two) times daily as needed for Anxiety.  Dispense: 60 tablet; Refill: 2  -     POCT Urine Drug Screen (With BUP); Standing  After reviewing la monitoring program, no aberrant behaviour noted.     5. Screening for thyroid disorder  -     TSH; Future; Expected date: 02/18/2025    6. Screen for STD (sexually transmitted disease)  -     HIV 1/2 Ag/Ab (4th Gen); Future; Expected date: 02/18/2025  -     C. trachomatis/N. gonorrhoeae by AMP DNA [facility]; Urine; Future; Expected date: 02/18/2025  -     RPR; Future; Expected date: 02/18/2025    7. Screening for HIV (human immunodeficiency virus)  -     HIV 1/2 Ag/Ab (4th Gen); Future; Expected date: 02/18/2025    8. Folliculitis  -     mupirocin (BACTROBAN) 2 % ointment; Apply topically 2 (two) times daily.  Dispense: 44 g; Refill: 1  -     cephALEXin (KEFLEX) 500 MG capsule; Take 1 capsule (500 mg total) by mouth 3 (three) times daily. for 7 days  Dispense: 21 capsule; Refill: 0              Strict return precautions reviewed and patient verbalized understanding. Risks, benefits, and alternatives to the plan were reviewed in detail and all questions answered to the patient's satisfaction. All questions were answered to the fullest satisfaction of the patient, and patient verbalized understanding and agreement to treatment plan. Patient agreed to call with any new or worsening symptoms, or present to the ER.     30 minutes total were spent on today's visit, not limited to but including time  based on counseling and coordination of care.    This note was generated with the assistance of ambient listening technology. Verbal consent was obtained by the patient and accompanying visitor(s) for the recording of patient appointment to facilitate this note. I attest to having reviewed and edited the generated note for accuracy, though some syntax or spelling errors may persist. Please contact the author of this note for any clarification.    Follow up in about 3 months (around 5/18/2025) for VV med check; 6mo in office follow up med check (anxiety).

## 2025-02-19 ENCOUNTER — RESULTS FOLLOW-UP (OUTPATIENT)
Dept: FAMILY MEDICINE | Facility: CLINIC | Age: 40
End: 2025-02-19

## 2025-02-19 LAB — HIV 1+2 AB+HIV1 P24 AG SERPL QL IA: NORMAL

## 2025-02-22 LAB
C TRACH DNA SPEC QL NAA+PROBE: NOT DETECTED
N GONORRHOEA DNA SPEC QL NAA+PROBE: NOT DETECTED

## 2025-04-23 ENCOUNTER — OFFICE VISIT (OUTPATIENT)
Dept: URGENT CARE | Facility: CLINIC | Age: 40
End: 2025-04-23
Payer: COMMERCIAL

## 2025-04-23 VITALS
OXYGEN SATURATION: 97 % | DIASTOLIC BLOOD PRESSURE: 83 MMHG | HEART RATE: 141 BPM | HEIGHT: 64 IN | WEIGHT: 125 LBS | SYSTOLIC BLOOD PRESSURE: 111 MMHG | RESPIRATION RATE: 16 BRPM | TEMPERATURE: 99 F | BODY MASS INDEX: 21.34 KG/M2

## 2025-04-23 DIAGNOSIS — B96.89 ACUTE BACTERIAL SINUSITIS: ICD-10-CM

## 2025-04-23 DIAGNOSIS — R05.9 COUGH, UNSPECIFIED TYPE: Primary | ICD-10-CM

## 2025-04-23 DIAGNOSIS — J01.90 ACUTE BACTERIAL SINUSITIS: ICD-10-CM

## 2025-04-23 DIAGNOSIS — H65.192 ACUTE EFFUSION OF LEFT EAR: ICD-10-CM

## 2025-04-23 DIAGNOSIS — U07.1 COVID-19: ICD-10-CM

## 2025-04-23 DIAGNOSIS — J02.9 SORE THROAT: ICD-10-CM

## 2025-04-23 LAB
CTP QC/QA: YES
FLUAV AG NPH QL: NEGATIVE
FLUBV AG NPH QL: NEGATIVE
S PYO RRNA THROAT QL PROBE: NEGATIVE
SARS CORONAVIRUS 2 ANTIGEN: POSITIVE

## 2025-04-23 RX ORDER — CETIRIZINE HYDROCHLORIDE 10 MG/1
10 TABLET ORAL DAILY
Qty: 30 TABLET | Refills: 11 | Status: SHIPPED | OUTPATIENT
Start: 2025-04-23

## 2025-04-23 RX ORDER — AMOXICILLIN AND CLAVULANATE POTASSIUM 875; 125 MG/1; MG/1
1 TABLET, FILM COATED ORAL EVERY 12 HOURS
Qty: 20 TABLET | Refills: 0 | Status: SHIPPED | OUTPATIENT
Start: 2025-04-23 | End: 2025-05-03

## 2025-04-23 RX ORDER — PROMETHAZINE HYDROCHLORIDE AND DEXTROMETHORPHAN HYDROBROMIDE 6.25; 15 MG/5ML; MG/5ML
5 SYRUP ORAL EVERY 4 HOURS PRN
Qty: 118 ML | Refills: 0 | Status: SHIPPED | OUTPATIENT
Start: 2025-04-23 | End: 2025-05-03

## 2025-04-23 NOTE — LETTER
April 23, 2025      [facility]  [address]  [place] [address]  Phone: [phone]       Patient: Diann Buitrago   YOB: 1985  Date of Visit: 04/23/2025    To Whom It May Concern:    William Buitrago  was at [facility] on 04/23/2025. The patient may return to work/school on 04/26/2025 with restrictions (must be fever free and symptoms improved for 24 hours). If you have any questions or concerns, or if I can be of further assistance, please do not hesitate to contact me.    Sincerely,    Shadi Haywood NP

## 2025-04-23 NOTE — PROGRESS NOTES
"Subjective:      Patient ID: Diann Buitrago is a 39 y.o. female.    Vitals:  height is 5' 4" (1.626 m) and weight is 56.7 kg (125 lb). Her temperature is 98.7 °F (37.1 °C). Her blood pressure is 111/83 and her pulse is 141 (abnormal). Her respiration is 16 and oxygen saturation is 97%.     Chief Complaint: Cough    Patient is a 39-year-old female with a past medical history anxiety, panic disorder, OCD, hyperlipidemia, DVT and GERD presents to clinic for evaluation of URI related symptoms.  Patient states symptoms for approximately 4 days now.  Patient states she is not vaccinated.  Patient states co-worker was sick with similar symptoms.  Patient states did a home COVID test which was negative.  Patient states over-the-counter Flonase, vitamins C and Advil with only mild relief to symptoms.    Cough  This is a new problem. Episode onset: 4/19. The cough is Productive of sputum. Associated symptoms include chills, ear pain (Left ear), a fever (Temperature max 99.5F), headaches, myalgias, nasal congestion, postnasal drip and a sore throat. Pertinent negatives include no chest pain, rash or shortness of breath. Associated symptoms comments: Left ear pain, body aches.       Constitution: Positive for chills, fatigue and fever (Temperature max 99.5F).   HENT:  Positive for ear pain (Left ear), hearing loss (Left ear), congestion, postnasal drip, sinus pressure and sore throat. Negative for ear discharge and tinnitus.    Neck: neck negative.   Cardiovascular: Negative.  Negative for chest pain and palpitations.   Eyes: Negative.    Respiratory:  Positive for cough and sputum production. Negative for chest tightness and shortness of breath.    Gastrointestinal: Negative.  Negative for abdominal pain, nausea, vomiting and diarrhea.   Endocrine: negative.   Genitourinary: Negative.  Negative for dysuria, frequency and urgency.   Musculoskeletal:  Positive for muscle ache.   Skin: Negative.  Negative for color change, pale, " rash and erythema.   Allergic/Immunologic: Negative.    Neurological:  Positive for headaches. Negative for dizziness, light-headedness, passing out, disorientation and altered mental status.   Hematologic/Lymphatic: Negative.    Psychiatric/Behavioral: Negative.  Negative for altered mental status, disorientation and confusion.       Objective:     Physical Exam   Constitutional: She is oriented to person, place, and time. She appears well-developed. She is cooperative.  Non-toxic appearance. She does not appear ill. No distress.   HENT:   Head: Normocephalic and atraumatic.   Ears:   Right Ear: Hearing, tympanic membrane, external ear and ear canal normal.   Left Ear: Hearing, external ear and ear canal normal. A middle ear effusion is present.   Nose: Congestion present. No mucosal edema, rhinorrhea or nasal deformity. No epistaxis. Right sinus exhibits maxillary sinus tenderness and frontal sinus tenderness. Left sinus exhibits maxillary sinus tenderness and frontal sinus tenderness.   Mouth/Throat: Uvula is midline and mucous membranes are normal. Mucous membranes are moist. No trismus in the jaw. Normal dentition. No uvula swelling. Posterior oropharyngeal erythema present. No oropharyngeal exudate. Oropharynx is clear.   Eyes: Conjunctivae and lids are normal. Pupils are equal, round, and reactive to light. Right eye exhibits no discharge. Left eye exhibits no discharge. No scleral icterus.   Neck: Trachea normal and phonation normal. Neck supple. No neck rigidity present.   Cardiovascular: Regular rhythm, normal heart sounds and normal pulses. Tachycardia present.   Pulmonary/Chest: Effort normal and breath sounds normal. No respiratory distress (Unlabored.  Equal rise and fall of chest.  Able speak in full complete sentences.  No adventitious breath sounds noted.). She has no wheezes. She has no rhonchi. She has no rales.   Abdominal: Normal appearance and bowel sounds are normal. She exhibits no  distension. Soft. There is no abdominal tenderness.   Musculoskeletal: Normal range of motion.         General: Normal range of motion.      Cervical back: She exhibits no tenderness.   Lymphadenopathy:     She has no cervical adenopathy.   Neurological: She is alert and oriented to person, place, and time. She exhibits normal muscle tone.   Skin: Skin is warm, dry, intact, not diaphoretic, not pale and no rash. Capillary refill takes less than 2 seconds. No erythema   Psychiatric: Her speech is normal and behavior is normal. Judgment and thought content normal.   Nursing note and vitals reviewed.      Assessment:     1. Cough, unspecified type    2. Sore throat    3. COVID-19    4. Acute bacterial sinusitis    5. Acute effusion of left ear        Plan:       Cough, unspecified type  -     POCT Influenza A/B Rapid Antigen  -     SARS Coronavirus 2 Antigen, POCT Manual Read    Sore throat  -     POCT rapid strep A    COVID-19    Acute bacterial sinusitis    Acute effusion of left ear    Other orders  -     amoxicillin-clavulanate 875-125mg (AUGMENTIN) 875-125 mg per tablet; Take 1 tablet by mouth every 12 (twelve) hours. for 10 days  Dispense: 20 tablet; Refill: 0  -     cetirizine (ZYRTEC) 10 MG tablet; Take 1 tablet (10 mg total) by mouth once daily.  Dispense: 30 tablet; Refill: 11  -     promethazine-dextromethorphan (PROMETHAZINE-DM) 6.25-15 mg/5 mL Syrp; Take 5 mLs by mouth every 4 (four) hours as needed (Cough).  Dispense: 118 mL; Refill: 0                Labs:  COVID positive.  Influenza a and B negative.  Rapid strep negative.  Quarantine as directed.  Quarantine information provided to patient.  COVID recommendations provided.  (Vitamin-C 2000 mg daily, vitamin D3 1000 IU daily, Pepcid 40 mg daily, Zyrtec 10 mg daily, zinc 50 mg daily)  Tylenol per package instructions for any pain or fever.  May rotate with Motrin if unable to control pain or fever along with Tylenol.  Monitor home SpO2 and present to  emergency department with readings less than 92%.  Take medications as prescribed.  Mucinex per package instructions for feeling of chest congestion or mucus relief.  Assure adequate hydration.  Follow-up with PCP in 1-2 days.  Return to clinic as needed.  To ED for any new or acutely worsening symptoms including but not limited to chest pain, palpitations, shortness of breath, or fever greater than 103° F.  Patient in agreement with plan of care.    DISCLAIMER: Please note that my documentation in this Electronic Healthcare Record was produced using speech recognition software and therefore may contain errors related to that software system.These could include grammar, punctuation and spelling errors or the inclusion/exclusion of phrases that were not intended. Garbled syntax, mangled pronouns, and other bizarre constructions may be attributed to that software system.

## 2025-06-19 ENCOUNTER — OFFICE VISIT (OUTPATIENT)
Dept: URGENT CARE | Facility: CLINIC | Age: 40
End: 2025-06-19
Payer: COMMERCIAL

## 2025-06-19 VITALS
HEART RATE: 114 BPM | DIASTOLIC BLOOD PRESSURE: 74 MMHG | TEMPERATURE: 98 F | BODY MASS INDEX: 21.34 KG/M2 | SYSTOLIC BLOOD PRESSURE: 144 MMHG | WEIGHT: 125 LBS | RESPIRATION RATE: 20 BRPM | OXYGEN SATURATION: 98 % | HEIGHT: 64 IN

## 2025-06-19 DIAGNOSIS — L02.11 ABSCESS OF SKIN OF NECK: Primary | ICD-10-CM

## 2025-06-19 DIAGNOSIS — H69.92 EUSTACHIAN TUBE DISORDER, LEFT: ICD-10-CM

## 2025-06-19 PROCEDURE — 99213 OFFICE O/P EST LOW 20 MIN: CPT | Mod: S$GLB,,,

## 2025-06-19 RX ORDER — CEPHALEXIN 500 MG/1
500 CAPSULE ORAL EVERY 8 HOURS
Qty: 30 CAPSULE | Refills: 0 | Status: SHIPPED | OUTPATIENT
Start: 2025-06-19 | End: 2025-06-29

## 2025-06-19 RX ORDER — AZELASTINE 1 MG/ML
1 SPRAY, METERED NASAL 2 TIMES DAILY PRN
Qty: 30 ML | Refills: 0 | Status: SHIPPED | OUTPATIENT
Start: 2025-06-19

## 2025-06-19 NOTE — PROGRESS NOTES
"Subjective:      Patient ID: Diann Buitrago is a 39 y.o. female.    Vitals:  height is 5' 4" (1.626 m) and weight is 56.7 kg (125 lb). Her oral temperature is 98.2 °F (36.8 °C). Her blood pressure is 144/74 (abnormal) and her pulse is 114 (abnormal). Her respiration is 20 and oxygen saturation is 98%.     Chief Complaint: Mass    39-year-old female patient with past medical history of generalized anxiety disorder, obsessive-compulsive disorder, panic attacks, anxiety, hyperlipidemia, DVT, and GERD presents to the clinic with complaints of a popping sensation to her left ear, sinus congestion, and a possible abscess to the left side of her neck.  Patient states she has been squeezing the area but can not get it to pop.  Patient states she has been using Flonase for her sinus congestion.  Patient denies any fever, headache, sore throat, cough, chest pain, shortness of breath, nausea, vomiting, diarrhea, or abdominal pain.    Mass  This is a new problem. The current episode started in the past 7 days. The problem occurs constantly. The problem has been unchanged. Pertinent negatives include no abdominal pain, chest pain, congestion, coughing, fatigue, fever, headaches, nausea, neck pain, numbness, rash, sore throat or vomiting. Exacerbated by: Burning Neck. Treatments tried: Mupirocin. The treatment provided no relief.       Constitution: Negative for fatigue and fever.   HENT:  Positive for ear pain (Popping sensation to left ear). Negative for ear discharge, tinnitus, hearing loss, congestion, postnasal drip, sinus pain, sinus pressure and sore throat.    Neck: Negative for neck pain.   Cardiovascular:  Negative for chest pain and palpitations.   Eyes:  Negative for eye itching.   Respiratory:  Negative for cough and shortness of breath.    Gastrointestinal:  Negative for abdominal pain, nausea, vomiting and diarrhea.   Skin:  Positive for color change and abscess (Below left side of mandible to upper portion of neck). " Negative for pale, rash, wound and erythema.   Neurological:  Negative for dizziness, headaches, disorientation, altered mental status, numbness and tingling.   Psychiatric/Behavioral:  Negative for altered mental status, disorientation and confusion.       Objective:     Physical Exam   Constitutional: She is oriented to person, place, and time. She appears well-developed.  Non-toxic appearance. She does not appear ill. No distress. normal  HENT:   Head: Normocephalic and atraumatic. Head is without abrasion, without contusion and without laceration.   Ears:   Right Ear: Tympanic membrane, external ear and ear canal normal. No no drainage, swelling or tenderness. Tympanic membrane is not erythematous and not bulging. No middle ear effusion. No decreased hearing is noted. no impacted cerumen  Left Ear: External ear and ear canal normal. No no drainage, swelling or tenderness. Tympanic membrane is not erythematous and not bulging. A middle ear effusion is present. No decreased hearing is noted. no impacted cerumen  Nose: Congestion present. No rhinorrhea.   Mouth/Throat: Oropharynx is clear and moist and mucous membranes are normal. Mucous membranes are moist. No oropharyngeal exudate or posterior oropharyngeal erythema. Oropharynx is clear.   Eyes: Conjunctivae, EOM and lids are normal. Pupils are equal, round, and reactive to light. Right eye exhibits no discharge. Left eye exhibits no discharge. No scleral icterus. Extraocular movement intact   Neck: Trachea normal and phonation normal. Neck supple. No neck rigidity present.   Cardiovascular: Regular rhythm and normal pulses. Tachycardia present.   Pulmonary/Chest: Effort normal. No stridor. No respiratory distress.   Abdominal: Normal appearance. She exhibits distension.   Musculoskeletal: Normal range of motion.         General: Normal range of motion.   Neurological: She is alert and oriented to person, place, and time.   Skin: Skin is warm, dry, intact, not  diaphoretic, no rash and abscessed. Capillary refill takes less than 2 seconds. No abrasion, No burn, No bruising, No erythema and No ecchymosis        Psychiatric: Her speech is normal and behavior is normal. Judgment and thought content normal.   Nursing note and vitals reviewed.      Assessment:     1. Abscess of skin of neck    2. Eustachian tube disorder, left        Plan:       Abscess of skin of neck    Eustachian tube disorder, left    Other orders  -     azelastine (ASTELIN) 137 mcg (0.1 %) nasal spray; 1 spray (137 mcg total) by Nasal route 2 (two) times daily as needed for Rhinitis (Sinus congestion).  Dispense: 30 mL; Refill: 0  -     cephALEXin (KEFLEX) 500 MG capsule; Take 1 capsule (500 mg total) by mouth every 8 (eight) hours. for 10 days  Dispense: 30 capsule; Refill: 0                Take medications as prescribed.  Continue taking Zyrtec as prescribed.  Continue applying mupirocin as prescribed.  Tylenol/Motrin per package instructions for any pain or fever.  Assure adequate hydration and rest.  Warm moist compresses as directed.  Nasal saline flushes or irrigation as directed for nasal saline congestion and sinus related symptoms.  Follow-up with PCP in 1-2 days.  Follow up with ENT if symptoms fail to improve in the next 10-14 days.  Return to clinic as needed.  To ED for any new or acutely worsening symptoms.  Patient in agreement with plan of care.    DISCLAIMER: Please note that my documentation in this Electronic Healthcare Record was produced using speech recognition software and therefore may contain errors related to that software system.These could include grammar, punctuation and spelling errors or the inclusion/exclusion of phrases that were not intended. Garbled syntax, mangled pronouns, and other bizarre constructions may be attributed to that software system.

## 2025-07-07 ENCOUNTER — HOSPITAL ENCOUNTER (OUTPATIENT)
Dept: RADIOLOGY | Facility: HOSPITAL | Age: 40
Discharge: HOME | End: 2025-07-07
Attending: NURSE PRACTITIONER
Payer: COMMERCIAL

## 2025-07-07 ENCOUNTER — OFFICE VISIT (OUTPATIENT)
Dept: URGENT CARE | Facility: CLINIC | Age: 40
End: 2025-07-07
Payer: COMMERCIAL

## 2025-07-07 VITALS
DIASTOLIC BLOOD PRESSURE: 74 MMHG | BODY MASS INDEX: 21.34 KG/M2 | SYSTOLIC BLOOD PRESSURE: 113 MMHG | HEIGHT: 64 IN | HEART RATE: 106 BPM | OXYGEN SATURATION: 96 % | TEMPERATURE: 97 F | WEIGHT: 125 LBS | RESPIRATION RATE: 18 BRPM

## 2025-07-07 DIAGNOSIS — R22.1 MASS OF LEFT SIDE OF NECK: Primary | ICD-10-CM

## 2025-07-07 DIAGNOSIS — R22.1 MASS OF LEFT SIDE OF NECK: ICD-10-CM

## 2025-07-07 PROCEDURE — 76536 US EXAM OF HEAD AND NECK: CPT | Mod: 26,,, | Performed by: RADIOLOGY

## 2025-07-07 PROCEDURE — 76536 US EXAM OF HEAD AND NECK: CPT | Mod: TC

## 2025-07-07 PROCEDURE — 99213 OFFICE O/P EST LOW 20 MIN: CPT | Mod: S$GLB,,, | Performed by: NURSE PRACTITIONER

## 2025-07-07 NOTE — PROGRESS NOTES
"Subjective:      Patient ID: Diann Buitrago is a 39 y.o. female.    Vitals:  height is 5' 4" (1.626 m) and weight is 56.7 kg (125 lb). Her oral temperature is 97.2 °F (36.2 °C). Her blood pressure is 113/74 and her pulse is 106. Her respiration is 18 and oxygen saturation is 96%.     Chief Complaint: Abscess    Pt states "she was seen 2 weeks ago and was treated for an abscess. The abscess never went away after taking the antibiotics."  Patient reports that the redness surrounding the mass and the mass itself has become slightly smaller after completion of antibiotics but not completely gone.    Abscess  Chronicity:  Recurrent  Associated Symptoms: no fever, no chills      Constitution: Negative for chills and fever.   Skin:  Positive for lesion (Lump left side of neck with redness and mild tenderness much improved after completion of antibiotics) and erythema (Mass left neck). Negative for wound and abscess.      Objective:     Physical Exam   Constitutional: She is oriented to person, place, and time.  Non-toxic appearance. She does not appear ill. No distress.   HENT:   Head: Normocephalic and atraumatic.   Nose: Nose normal.   Mouth/Throat: Mucous membranes are moist.   Eyes: Conjunctivae are normal.   Neck:      Comments: 1 cm x 1 cm raised mildly erythematous mass left neck with defined margins without surrounding induration or concern for cellulitis.  Patient reports it is mildly TTP.  No obvious collection of purulent material uncertain origin of mass to be cystic lesion versus abscess less likely abscess   Pulmonary/Chest: Effort normal. No respiratory distress.   Abdominal: Normal appearance.   Neurological: no focal deficit. She is alert and oriented to person, place, and time.   Skin: Skin is warm, dry, intact, not diaphoretic, no rash and not nodular. Capillary refill takes 2 to 3 seconds. erythema (Mass left neck) and lesion No bruising   Psychiatric: Her behavior is normal. Mood normal.   Nursing note " and vitals reviewed.              Assessment:     1. Mass of left side of neck        Plan:       Mass of left side of neck  -     US Soft Tissue Head Neck; Future; Expected date: 07/07/2025  -     Ambulatory referral/consult to ENT                There is no signs and symptoms of cellulitis or surrounding infection.  Uncertain if masses actually abscess in need of I&D.  Will send for ultrasound soft tissue neck for better identification and defer to specialist

## 2025-07-07 NOTE — PATIENT INSTRUCTIONS
Go directly over to the [facility] which is now [facility] and check into the outpatient registration which is located just to the right-hand side of the emergency department entrance.  Let them know that you were there for an outpatient ultrasound that the orders are in the computer.  Once the ultrasound is done you may go home and someone will be calling you later once we have received results and had time to review them.    Referral placed to ear nose and throat.  Someone should be calling you to schedule an appointment

## 2025-07-08 ENCOUNTER — HOSPITAL ENCOUNTER (OUTPATIENT)
Dept: RADIOLOGY | Facility: HOSPITAL | Age: 40
Discharge: HOME | End: 2025-07-08
Payer: COMMERCIAL

## 2025-07-08 DIAGNOSIS — R22.1 MASS OF LEFT SIDE OF NECK: ICD-10-CM

## 2025-07-08 PROCEDURE — 70491 CT SOFT TISSUE NECK W/DYE: CPT | Mod: 26,,, | Performed by: RADIOLOGY

## 2025-07-08 PROCEDURE — 25500020 PHARM REV CODE 255

## 2025-07-08 PROCEDURE — 70491 CT SOFT TISSUE NECK W/DYE: CPT | Mod: TC

## 2025-07-08 RX ADMIN — IOHEXOL 75 ML: 350 INJECTION, SOLUTION INTRAVENOUS at 05:07

## 2025-07-09 ENCOUNTER — TELEPHONE (OUTPATIENT)
Dept: URGENT CARE | Facility: CLINIC | Age: 40
End: 2025-07-09
Payer: COMMERCIAL

## 2025-07-09 ENCOUNTER — RESULTS FOLLOW-UP (OUTPATIENT)
Dept: URGENT CARE | Facility: CLINIC | Age: 40
End: 2025-07-09
Payer: COMMERCIAL

## 2025-07-09 NOTE — TELEPHONE ENCOUNTER
Spoke with patient regarding CT neck results and incidental finding of right lung apex 3 mm pulmonary nodule.  Recommended close follow up with her primary care with radiologist's recommendation for CT chest follow-up.  Patient reports she has already called and schedule an appointment with her PCP at the end of the month.  Also further discussed findings regarding the mass left neck.  Referral had been placed to ENT, she missed calling while at work and it will be calling them back to schedule an appointment as we discussed for these findings it is needed for specialist evaluation and management

## 2025-07-11 ENCOUNTER — OFFICE VISIT (OUTPATIENT)
Dept: OTOLARYNGOLOGY | Facility: CLINIC | Age: 40
End: 2025-07-11
Payer: COMMERCIAL

## 2025-07-11 VITALS — HEIGHT: 64 IN | BODY MASS INDEX: 21.76 KG/M2 | WEIGHT: 127.44 LBS

## 2025-07-11 DIAGNOSIS — L72.3 INFECTED SEBACEOUS CYST OF SKIN: Primary | ICD-10-CM

## 2025-07-11 DIAGNOSIS — L08.9 INFECTED SEBACEOUS CYST OF SKIN: Primary | ICD-10-CM

## 2025-07-11 PROCEDURE — 99999 PR PBB SHADOW E&M-EST. PATIENT-LVL III: CPT | Mod: PBBFAC,,, | Performed by: OTOLARYNGOLOGY

## 2025-07-11 RX ORDER — CEPHALEXIN 500 MG/1
500 CAPSULE ORAL 4 TIMES DAILY
Qty: 40 CAPSULE | Refills: 0 | Status: SHIPPED | OUTPATIENT
Start: 2025-07-11 | End: 2025-07-21

## 2025-07-11 NOTE — PROGRESS NOTES
"Subjective:       Patient ID: Diann Buitrago is a 39 y.o. female.    Chief Complaint: Mass (Patient here for consult on the " mass under my chin on my neck. I had a CT and an US and urgent care said I needed to have it removed. Also I'm having fluid build up in my ears." )      This generally healthy 39-year-old a little more than a month ago basically developed a big pimple of her left neck just above the hyoid bone.  She tried to squeeze it and that made it worse she was seen by primary care given Keflex that improve the swelling and redness has a CAT scan done and an ultrasound confirming that it was a pimple/skin structure issue and presents today to discuss removal    Unfortunately she is allergic to sulfa drugs in his also had reactions to clindamycin and doxycycline she tells me.    She also mentioned she has had some issues with the left ear since she has a COVID infection it makes more of a popping sound when she swallows than she would expect and there are some tinnitus events that come and go.          Objective:      ENT Physical Exam    So in the junction of the submental region and the neck itself just above or at the hyoid that is a 1 cm slightly red skin structure lesion that is adherent to the skin easy to get your fingers around it and is not suspicious for any other type of lesion     Her neck is otherwise without abnormality or cervical adenopathy     Her tympanic membranes and ear canals are normal she does mentioned some issues with the left ear that developed after COVID that is intermittent it pops more than it should and she will have changes in her hearing to some extent.        Assessment:       1. Infected sebaceous cyst of skin         Plan:          So this is only been there a month I think that is still a role for us to give it some time and see if it resolves it may not a formed a distinct cyst yet.  She has allergies to the medicines I typically prefer to use but I put her on another " 10 days of Keflex I have encouraged her to put benzoyl peroxide on this being cautious not to accidentally bleach clothing which it can ruin, and we have set her up to see me in a month or so both to do an audiogram to check this left ear issue and if the lesion has not improved significantly we can excise it.  That could be done in the office.

## 2025-07-22 ENCOUNTER — OFFICE VISIT (OUTPATIENT)
Dept: FAMILY MEDICINE | Facility: CLINIC | Age: 40
End: 2025-07-22
Payer: COMMERCIAL

## 2025-07-22 VITALS
SYSTOLIC BLOOD PRESSURE: 112 MMHG | HEART RATE: 115 BPM | DIASTOLIC BLOOD PRESSURE: 62 MMHG | BODY MASS INDEX: 21.92 KG/M2 | HEIGHT: 64 IN | OXYGEN SATURATION: 99 % | WEIGHT: 128.38 LBS

## 2025-07-22 DIAGNOSIS — Z76.0 MEDICATION REFILL: ICD-10-CM

## 2025-07-22 DIAGNOSIS — F41.0 GENERALIZED ANXIETY DISORDER WITH PANIC ATTACKS: Primary | ICD-10-CM

## 2025-07-22 DIAGNOSIS — L72.9 INFECTED CYST OF SKIN: ICD-10-CM

## 2025-07-22 DIAGNOSIS — L72.0 EPIDERMAL INCLUSION CYST: ICD-10-CM

## 2025-07-22 DIAGNOSIS — F41.1 GENERALIZED ANXIETY DISORDER WITH PANIC ATTACKS: Primary | ICD-10-CM

## 2025-07-22 DIAGNOSIS — R91.1 LUNG NODULE SEEN ON IMAGING STUDY: ICD-10-CM

## 2025-07-22 DIAGNOSIS — Z79.899 LONG-TERM CURRENT USE OF BENZODIAZEPINE: ICD-10-CM

## 2025-07-22 DIAGNOSIS — L08.9 INFECTED CYST OF SKIN: ICD-10-CM

## 2025-07-22 LAB
AMP AMPHETAMINE 1000 NM/ML POC: NEGATIVE
BAR BARBITURATES 300 NG/ML POC: NEGATIVE
BUP BUPRENORPHINE 10 NG/ML POC: NEGATIVE
BZO BENZODIAZEPINES 300 NG/ML POC: ABNORMAL
COC COCAINE 300 NG/ML POC: NEGATIVE
CREATININE (CR) POC: NEGATIVE
CTP QC/QA: YES
MET METHAMPHETAMINE 1000 NG/ML POC: NEGATIVE
MOP/OPI300 MORPHINE 300 NG/ML POC: NEGATIVE
MTD METHADONE 300 NG/ML POC: NEGATIVE
OXIDANT (OX) POC: NEGATIVE
OXY OXYCODONE 100 NG/ML POC: NEGATIVE
SPECIFIC GRAVITY (SG) POC: 1
TEMPERATURE (°F) POC: 90
THC MARIJUANA 50 NG/ML POC: NEGATIVE

## 2025-07-22 PROCEDURE — 3074F SYST BP LT 130 MM HG: CPT | Mod: S$GLB,,, | Performed by: FAMILY MEDICINE

## 2025-07-22 PROCEDURE — 1160F RVW MEDS BY RX/DR IN RCRD: CPT | Mod: S$GLB,,, | Performed by: FAMILY MEDICINE

## 2025-07-22 PROCEDURE — G2211 COMPLEX E/M VISIT ADD ON: HCPCS | Mod: S$GLB,,, | Performed by: FAMILY MEDICINE

## 2025-07-22 PROCEDURE — 1159F MED LIST DOCD IN RCRD: CPT | Mod: S$GLB,,, | Performed by: FAMILY MEDICINE

## 2025-07-22 PROCEDURE — 3008F BODY MASS INDEX DOCD: CPT | Mod: S$GLB,,, | Performed by: FAMILY MEDICINE

## 2025-07-22 PROCEDURE — 80305 DRUG TEST PRSMV DIR OPT OBS: CPT | Mod: QW,S$GLB,, | Performed by: FAMILY MEDICINE

## 2025-07-22 PROCEDURE — 99214 OFFICE O/P EST MOD 30 MIN: CPT | Mod: S$GLB,,, | Performed by: FAMILY MEDICINE

## 2025-07-22 PROCEDURE — 99999 PR PBB SHADOW E&M-EST. PATIENT-LVL III: CPT | Mod: PBBFAC,,, | Performed by: FAMILY MEDICINE

## 2025-07-22 PROCEDURE — 3078F DIAST BP <80 MM HG: CPT | Mod: S$GLB,,, | Performed by: FAMILY MEDICINE

## 2025-07-22 RX ORDER — ALPRAZOLAM 1 MG/1
1 TABLET ORAL 2 TIMES DAILY PRN
Qty: 50 TABLET | Refills: 2 | Status: SHIPPED | OUTPATIENT
Start: 2025-07-22 | End: 2025-10-20

## 2025-07-22 RX ORDER — FLUCONAZOLE 150 MG/1
150 TABLET ORAL
Qty: 5 TABLET | Refills: 0 | Status: SHIPPED | OUTPATIENT
Start: 2025-07-22

## 2025-07-22 RX ORDER — CEPHALEXIN 500 MG/1
500 CAPSULE ORAL EVERY 6 HOURS
Qty: 16 CAPSULE | Refills: 0 | Status: SHIPPED | OUTPATIENT
Start: 2025-07-22 | End: 2025-07-26

## 2025-07-22 NOTE — PROGRESS NOTES
"  Subjective:       Patient ID: Diann Buitrago is a 39 y.o. female.    Chief Complaint: Mass (Lump on left side of neck /Found 3mm module in left lung ) and Follow-up (3mnth follow up )    Here today for follow up anxiety medication (Xanax), concern re: knot on neck, and abnormal imaging of chest (lung nodule.)    Patient reports current medication regimen is working to help with anxiety with panic attack symptoms. Patient reports compliance with medication. Recent workup for neck "mass" and lung nodule finding has increased her anxiety.    Knot on L neck tender sometimes, was det to be an epidermal inclusion cyst (infected) with reactive LAD per US.    Impression:     1. Small, solid soft tissue nodule in the superficial soft tissues of the left submandibular region abutting the skin surface in the area of palpable concern, measuring 9 x 6 x 5 mm.  This is nonspecific and may reflect a superficial inflammatory or infectious lesion, such as an infected epidermal inclusion cyst.  Neoplastic lesion less likely.  Recommend correlation with direct visualization.  2. Mildly prominent upper cervical chain lymph nodes, nonspecific and probably reactive.  3. Mediastinal lymphadenopathy, with partially calcified prevascular lymph node.    These are nonspecific and may be reactive or reflect sequelae of prior granulomatous disease.  Correlation with dedicated chest CT could be performed, as clinically indicated.  4. Solid, noncalcified 3 mm pulmonary nodule in the right lung apex.  Yellow Pulmonary Nodule 2017 Alert:     Yellow Actionable Finding (Radiology: Volume 284: Number 1-July 2017)     [facility] Guidelines do not apply in patients younger than 35 years, immunocompromised patients or patients with cancer. Risk assignment based on ACCP with low risk being less than 5% and high risk combining intermediate and high risk categories.     UNEXPECTED FINDINGS:  Incidental Pulmonary Nodule Single Solid <6mm (1)   " "  RECOMMENDATIONS:  If Low Risk No routine follow up, if High Risk optional CT Chest without contrast 12 months        Electronically signed by:Raymundo Bird  Date:                                            2025  Time:                                           08:44            2025     1:40 PM 10/14/2024     1:41 PM 5/10/2023     8:51 AM   Depression Patient Health Questionnaire   Over the last two weeks how often have you been bothered by little interest or pleasure in doing things Not at all Not at all Not at all   Over the last two weeks how often have you been bothered by feeling down, depressed or hopeless Not at all Not at all Not at all   PHQ-2 Total Score 0 0 0          No data to display                Review of Systems   Respiratory:  Negative for cough, shortness of breath and wheezing.    Cardiovascular:  Negative for chest pain.   All other systems reviewed and are negative.        Past Medical History:   Diagnosis Date    Endometriosis, unspecified     GERD (gastroesophageal reflux disease)      Past Surgical History:   Procedure Laterality Date    2 csections       SECTION  2009    EYE SURGERY      Lasic    HYSTERECTOMY  2016    hysterectomy        Family History   Problem Relation Name Age of Onset    Hypertension Mother Alma garrison     Heart disease Mother Alma garrison        Review of patient's allergies indicates:   Allergen Reactions    Sulfa (sulfonamide antibiotics) Hives    Clindamycin Rash    Doxycycline Diarrhea, Nausea And Vomiting, Other (See Comments) and Photosensitivity     Current Medications[1]      Objective:      /62 (BP Location: Left arm, Patient Position: Sitting)   Pulse (!) 115   Ht 5' 4" (1.626 m)   Wt 58.2 kg (128 lb 6.4 oz)   SpO2 99%   BMI 22.04 kg/m²   Physical Exam  Vitals reviewed.   Constitutional:       General: She is not in acute distress.     Appearance: Normal appearance. She is not ill-appearing, toxic-appearing or " diaphoretic.   HENT:      Nose: No congestion.      Mouth/Throat:      Mouth: Mucous membranes are moist.   Eyes:      General: No scleral icterus.        Right eye: No discharge.         Left eye: No discharge.      Conjunctiva/sclera: Conjunctivae normal.   Neck:      Vascular: No carotid bruit.   Cardiovascular:      Rate and Rhythm: Normal rate and regular rhythm.      Heart sounds: Normal heart sounds. No murmur heard.  Pulmonary:      Effort: Pulmonary effort is normal. No respiratory distress.      Breath sounds: Normal breath sounds. No wheezing, rhonchi or rales.   Abdominal:      General: There is no distension.   Musculoskeletal:      Cervical back: Neck supple. No tenderness.   Lymphadenopathy:      Cervical: Cervical adenopathy (L anterior cervical LAD, mild shotty R anterior cervical LAD) present.   Skin:     General: Skin is warm and dry.      Capillary Refill: Capillary refill takes less than 2 seconds.      Coloration: Skin is not jaundiced or pale.   Neurological:      General: No focal deficit present.      Mental Status: She is alert and oriented to person, place, and time. Mental status is at baseline.   Psychiatric:         Mood and Affect: Mood normal.         Behavior: Behavior normal.         Thought Content: Thought content normal.         Assessment:       1. Generalized anxiety disorder with panic attacks    2. Long-term current use of benzodiazepine    3. Medication refill    4. Infected cyst of skin    5. Epidermal inclusion cyst    6. Lung nodule seen on imaging study        Plan:       Visit today included increased complexity associated with the care of the episodic problem listed below addressed and managing the longitudinal care of the patient due to the serious and/or complex managed problem(s) listed below.      Diann was seen today for mass and follow-up.    Diagnoses and all orders for this visit:    Generalized anxiety disorder with panic attacks  -     POCT Urine Drug Screen  (With BUP)  -     ALPRAZolam (XANAX) 1 MG tablet; Take 1 tablet (1 mg total) by mouth 2 (two) times daily as needed for Anxiety. Rx to last 30 days.    Long-term current use of benzodiazepine  -     ALPRAZolam (XANAX) 1 MG tablet; Take 1 tablet (1 mg total) by mouth 2 (two) times daily as needed for Anxiety. Rx to last 30 days.    Medication refill  -     ALPRAZolam (XANAX) 1 MG tablet; Take 1 tablet (1 mg total) by mouth 2 (two) times daily as needed for Anxiety. Rx to last 30 days.    Infected cyst of skin; Epidermal inclusion cyst  -     cephALEXin (KEFLEX) 500 MG capsule; Take 1 capsule (500 mg total) by mouth every 6 (six) hours. for 4 days    Lung nodule seen on imaging study  Incidental. Very low risk. No follow up needed. Can revisit ?repeat imaging at 6mo follow up.    Other orders  -     fluconazole (DIFLUCAN) 150 MG Tab; Take 1 tablet (150 mg total) by mouth every 72 hours as needed (yeast infection).              Previous records in Epic were reviewed, including the last 3 months of encounters, imaging, laboratory, and pathology reports.    Strict return precautions reviewed and patient verbalized understanding. Risks, benefits, and alternatives to the plan were reviewed in detail and all questions answered to the patient's satisfaction. Patient agrees to return to clinic or ER if symptoms worsen. 30 minutes total were spent on today's visit, not limited to but including time based on counseling and coordination of care.    Patient instructed that best way to communicate with my office staff is for patient to get on the [facility] epic patient portal to expedite communication and communication issues that may occur.  Patient was given instructions on how to get on the portal.  I encouraged patient to obtain portal access as well.  Ultimately it is up to the patient to obtain access.  Patient voiced understanding.    This note was created using [employer] voice recognition software that occasionally may  misinterpret phrases or words.    Follow up in about 3 months (around 10/22/2025) for f/u med check, med RF Xanax.         [1]   Current Outpatient Medications:     esomeprazole (NEXIUM) 20 MG capsule, Take 1 capsule by mouth 2 (two) times a day., Disp: , Rfl:     mupirocin (BACTROBAN) 2 % ointment, Apply topically 2 (two) times daily., Disp: 44 g, Rfl: 1    ALPRAZolam (XANAX) 1 MG tablet, Take 1 tablet (1 mg total) by mouth 2 (two) times daily as needed for Anxiety. Rx to last 30 days., Disp: 50 tablet, Rfl: 2    fluconazole (DIFLUCAN) 150 MG Tab, Take 1 tablet (150 mg total) by mouth every 72 hours as needed (yeast infection)., Disp: 5 tablet, Rfl: 0

## 2025-08-01 ENCOUNTER — TELEPHONE (OUTPATIENT)
Dept: OTOLARYNGOLOGY | Facility: CLINIC | Age: 40
End: 2025-08-01
Payer: COMMERCIAL

## 2025-08-01 NOTE — TELEPHONE ENCOUNTER
Called pt. Apologized and let her know that Dr. Bermudez is not in the office today. R/S her appt to next week in [place]. Thanks, Laura

## 2025-08-06 ENCOUNTER — OFFICE VISIT (OUTPATIENT)
Dept: OTOLARYNGOLOGY | Facility: CLINIC | Age: 40
End: 2025-08-06
Payer: COMMERCIAL

## 2025-08-06 VITALS — WEIGHT: 128.31 LBS | HEIGHT: 64 IN | BODY MASS INDEX: 21.91 KG/M2

## 2025-08-06 DIAGNOSIS — L72.3 INFECTED SEBACEOUS CYST: Primary | ICD-10-CM

## 2025-08-06 DIAGNOSIS — L08.9 INFECTED SEBACEOUS CYST: Primary | ICD-10-CM

## 2025-08-06 PROCEDURE — 99999 PR PBB SHADOW E&M-EST. PATIENT-LVL III: CPT | Mod: PBBFAC,,, | Performed by: OTOLARYNGOLOGY

## 2025-08-06 NOTE — PROGRESS NOTES
Subjective:       Patient ID: Diann Buitrago is a 39 y.o. female.    Chief Complaint: Mass Removal (Patient here to have mass/ cyst removed. )      This patient presents to have what appears to be an infected skin structure or infected skin structure cyst of her left submandibular region that is been present for enough months that it has a appropriate to remove it instead of continuing to treat infectious flare-ups.          Objective:      ENT Physical Exam    Procedure:     In the procedure and exam chair in the office the area was examined that is still was reddened inflamed with some level of persistent inflammation it was prepped out with alcohol and then injected circumferentially with 1% lidocaine with epinephrine     At that point it was draped out and an elliptical incision including the abnormally thin chronically infected skin was ellipsed out using an iris scissors and forceps the nondiscrete phlegmon of friable tissue was excised down to the subcutaneous fat and without going any deeper or further than seem necessary the area was cleared out of all abnormal looking tissue.    At that point a 4-0 Vicryl was used to close the wound up in the middle of the wound and five 0 Prolene was used in simple interrupted fashion to close the skin.  Benzoin and Steri-Strips were applied over the small wound and the wound was oriented in the same way as the nearby neck skin fold but there was no skin fold exactly over the area that we operated.    Including the overlying skin and the subcutaneous friable infected material the greatest dimension of the lesion that was resected was a proximally 3 cm.        Assessment:       1. Infected sebaceous cyst         Plan:          I am going to see her in one-week to remove the sutures

## 2025-08-13 ENCOUNTER — OFFICE VISIT (OUTPATIENT)
Dept: OTOLARYNGOLOGY | Facility: CLINIC | Age: 40
End: 2025-08-13
Payer: COMMERCIAL

## 2025-08-13 VITALS — BODY MASS INDEX: 21.85 KG/M2 | WEIGHT: 128 LBS | HEIGHT: 64 IN

## 2025-08-13 DIAGNOSIS — L72.3 INFECTED SEBACEOUS CYST: Primary | ICD-10-CM

## 2025-08-13 DIAGNOSIS — L08.9 INFECTED SEBACEOUS CYST: Primary | ICD-10-CM

## 2025-08-13 PROCEDURE — 99024 POSTOP FOLLOW-UP VISIT: CPT | Mod: S$GLB,,, | Performed by: OTOLARYNGOLOGY

## 2025-08-13 PROCEDURE — 99999 PR PBB SHADOW E&M-EST. PATIENT-LVL III: CPT | Mod: PBBFAC,,, | Performed by: OTOLARYNGOLOGY

## 2025-08-13 PROCEDURE — 1159F MED LIST DOCD IN RCRD: CPT | Mod: S$GLB,,, | Performed by: OTOLARYNGOLOGY

## 2025-08-13 PROCEDURE — 1160F RVW MEDS BY RX/DR IN RCRD: CPT | Mod: S$GLB,,, | Performed by: OTOLARYNGOLOGY
